# Patient Record
Sex: MALE | Employment: OTHER | URBAN - METROPOLITAN AREA
[De-identification: names, ages, dates, MRNs, and addresses within clinical notes are randomized per-mention and may not be internally consistent; named-entity substitution may affect disease eponyms.]

---

## 2017-06-09 DIAGNOSIS — Z87.891 HISTORY OF TOBACCO USE: Primary | ICD-10-CM

## 2017-09-06 ENCOUNTER — TELEPHONE (OUTPATIENT)
Dept: OPHTHALMOLOGY | Facility: CLINIC | Age: 77
End: 2017-09-06

## 2017-12-20 ENCOUNTER — TELEPHONE (OUTPATIENT)
Dept: HEMATOLOGY/ONCOLOGY | Facility: CLINIC | Age: 77
End: 2017-12-20

## 2017-12-20 NOTE — TELEPHONE ENCOUNTER
Received notification that patient went to [facility].  He was told of the attempts to schedule an apptmnt here at [facility].  Patient refused to come here as he prefers to go stay within [place].  Relayed to Raheel.  =========================================    Unable to reach patient (tried 2 phone numbers)    =========================================  From: Raheel Zavala RN  Sent: 2017  11:40 AM  To: Esthela Waldron RN    Lets do 8am    ----- Message -----  From: Esthela Waldron RN  Sent: 2017  11:13 AM  To: ANGÉLICA Palmer, on the ? what time ?    - Esthela  ----- Message -----  From: Raheel Zavala RN  Sent: 2017  10:12 AM  To: [facility]    Richard Bonilla- prostate cancer   40  Phone [phone]    Dr. Mata wants him on for next Tuesday as a consult. He said they should be faxing records

## 2017-12-22 ENCOUNTER — INITIAL CONSULT (OUTPATIENT)
Dept: HEMATOLOGY/ONCOLOGY | Facility: CLINIC | Age: 77
End: 2017-12-22
Payer: MEDICARE

## 2017-12-22 VITALS
DIASTOLIC BLOOD PRESSURE: 66 MMHG | OXYGEN SATURATION: 97 % | TEMPERATURE: 98 F | HEIGHT: 71 IN | SYSTOLIC BLOOD PRESSURE: 124 MMHG | BODY MASS INDEX: 25.65 KG/M2 | WEIGHT: 183.19 LBS | HEART RATE: 58 BPM

## 2017-12-22 DIAGNOSIS — Z85.528 HISTORY OF KIDNEY CANCER: ICD-10-CM

## 2017-12-22 DIAGNOSIS — R97.20 ELEVATED PSA: ICD-10-CM

## 2017-12-22 DIAGNOSIS — Z85.51 HISTORY OF BLADDER CANCER: Primary | ICD-10-CM

## 2017-12-22 PROBLEM — C67.9 BLADDER CANCER: Status: ACTIVE | Noted: 2017-12-22

## 2017-12-22 PROCEDURE — 99499 UNLISTED E&M SERVICE: CPT | Mod: S$GLB,,, | Performed by: INTERNAL MEDICINE

## 2017-12-22 PROCEDURE — 99999 PR PBB SHADOW E&M-EST. PATIENT-LVL III: CPT | Mod: PBBFAC,,, | Performed by: INTERNAL MEDICINE

## 2017-12-22 RX ORDER — LISINOPRIL 10 MG/1
TABLET ORAL
Refills: 1 | Status: ON HOLD | COMMUNITY
Start: 2017-10-28 | End: 2020-04-19 | Stop reason: CLARIF

## 2017-12-22 RX ORDER — TAMSULOSIN HYDROCHLORIDE 0.4 MG/1
CAPSULE ORAL
Refills: 2 | COMMUNITY
Start: 2017-10-28

## 2017-12-22 RX ORDER — DORZOLAMIDE HYDROCHLORIDE AND TIMOLOL MALEATE 20; 5 MG/ML; MG/ML
SOLUTION/ DROPS OPHTHALMIC
Refills: 2 | Status: ON HOLD | COMMUNITY
Start: 2017-12-06 | End: 2020-04-19 | Stop reason: CLARIF

## 2017-12-22 RX ORDER — HYDROCODONE BITARTRATE AND ACETAMINOPHEN 5; 325 MG/1; MG/1
TABLET ORAL
Refills: 0 | Status: ON HOLD | COMMUNITY
Start: 2017-12-12 | End: 2020-04-19 | Stop reason: CLARIF

## 2017-12-22 RX ORDER — CIPROFLOXACIN 500 MG/1
TABLET ORAL
Refills: 0 | Status: ON HOLD | COMMUNITY
Start: 2017-12-12 | End: 2020-04-19 | Stop reason: CLARIF

## 2017-12-22 RX ORDER — ATENOLOL 25 MG/1
TABLET ORAL
Refills: 2 | Status: ON HOLD | COMMUNITY
Start: 2017-11-16 | End: 2020-04-19 | Stop reason: CLARIF

## 2017-12-22 RX ORDER — GABAPENTIN 100 MG/1
CAPSULE ORAL
Refills: 5 | Status: ON HOLD | COMMUNITY
Start: 2017-10-28 | End: 2020-04-19 | Stop reason: CLARIF

## 2017-12-22 RX ORDER — BRIMONIDINE TARTRATE 2 MG/ML
1 SOLUTION/ DROPS OPHTHALMIC DAILY
Refills: 4 | COMMUNITY
Start: 2017-12-04

## 2017-12-22 RX ORDER — DOCUSATE SODIUM 100 MG/1
CAPSULE, LIQUID FILLED ORAL
Refills: 0 | Status: ON HOLD | COMMUNITY
Start: 2017-11-03 | End: 2020-04-19 | Stop reason: CLARIF

## 2017-12-22 RX ORDER — FUROSEMIDE 40 MG/1
TABLET ORAL
Refills: 2 | Status: ON HOLD | COMMUNITY
Start: 2017-09-23 | End: 2020-04-19 | Stop reason: CLARIF

## 2017-12-22 NOTE — PROGRESS NOTES
Subjective:       Patient ID: Richard Rome is a 77 y.o. male.    Chief Complaint: Consult (bladder ca)    HPI     PT ARRIVED 15 MIN LATE FOR VISIT  PT SEVERELY HEARING IMPAIRED      77-year-old with history of renal cell carcinoma of the left kidney diagnosed several years ago.  Patient status post nephro ureterectomy.  Patient lost to follow-up.  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  MRI of the lumbar spine films 11/30/2017 reveals degenerative changes, no acute fracture evidence for metastases.  Patient had an elevated PSA of 5.58 male nanograms per mL on 12/6/2017.  No family history of prostate cancer.  No prior biopsy of the prostate.  Patient followed by urology and plans for TRUS guided prostate biopsy.  She is accompanied by his daughter today.  Patient and daughter reports that they thought that they were undergoing the prostate biopsy today.  Patient's daughter upset . Patient referred here due to insurance issues.            Past Medical History:   Diagnosis Date    Arthritis     Bladder cancer     Bladder cancer 12/22/2017    Cataract     CHF (congestive heart failure)     Depression     Hypertension     Meningitis     Stroke 2014           Review of Systems   Constitutional: Negative for appetite change, fatigue, fever and unexpected weight change.   HENT: Negative for mouth sores.    Eyes: Negative for visual disturbance.   Respiratory: Negative for cough and shortness of breath.    Cardiovascular: Negative for chest pain.   Gastrointestinal: Negative for abdominal pain and diarrhea.   Genitourinary: Negative for frequency.   Musculoskeletal: Negative for back pain.   Skin: Negative for  "rash.   Neurological: Negative for headaches.   Hematological: Negative for adenopathy.   Psychiatric/Behavioral: The patient is not nervous/anxious.        Objective:        Vitals:    12/22/17 0818 12/22/17 0837   BP: 124/66    BP Location: Right arm    Patient Position: Sitting    BP Method: Medium (Automatic)    Pulse: (!) 59 (!) 58   Temp: 97.6 °F (36.4 °C)    TempSrc: Oral    SpO2: 97%    Weight: 83.1 kg (183 lb 3.2 oz)    Height: 5' 11" (1.803 m)        Physical Exam     Deferred   Assessment:       1. History of bladder cancer    2. History of kidney cancer    3. Elevated PSA        Plan:   77-year-old with history of renal cell carcinoma status post nephroureterectomy and with transitional cell carcinoma of the bladder now with elevated PSA and abnormal CT imaging revealing liver and bony metastases. Patient  and daughter elect to follow-up with urology at [facility]. Daughter reports procedure planned in early January and authorized per insurance  We will gladly refer the patient to urology at this facility.  Patient and daughter decline/not interested in Urology referral at this facility.   Today will follow-up with her treating urologist at [facility].  They will likely need to undergo bone and/or liver biopsy to determine of primary.  Daughter elects to have her treating urologist coordinate any further biopsies    Patient will not be billed for services    Cc: Parviz Ernandez MD      "

## 2017-12-22 NOTE — LETTER
December 24, 2017      Parviz Nascimento MD  [address]  [place] [id number]           [facility]  [address] [place], [place] [address] [address]  Phone: [phone]          Patient: Richard Rome   MR Number: [id number]   YOB: 1940   Date of Visit: 12/22/2017       Dear Dr. Parviz Nascimento:    Thank you for referring Richard Rome to me for evaluation. Attached you will find relevant portions of my assessment and plan of care.    If you have questions, please do not hesitate to call me. I look forward to following Richard Rome along with you.    Sincerely,    Yvette Maloney MD    Enclosure  CC:  No Recipients    If you would like to receive this communication electronically, please contact [email or web address] or [phone] to request more information on [email or web address] Link access.    For providers and/or their staff who would like to refer a patient to [facility], please contact us through our one-stop-shop provider referral line, Lianet Cobb, at [phone].    If you feel you have received this communication in error or would no longer like to receive these types of communications, please e-mail [email or web address]

## 2017-12-22 NOTE — Clinical Note
Pt declines Urology f/u at this facility Pt elects to f/u with his treating urologist and undergo TURP at

## 2020-04-17 ENCOUNTER — ANESTHESIA (OUTPATIENT)
Dept: SURGERY | Facility: HOSPITAL | Age: 80
DRG: 481 | End: 2020-04-17
Payer: MEDICARE

## 2020-04-17 ENCOUNTER — ANESTHESIA EVENT (OUTPATIENT)
Dept: SURGERY | Facility: HOSPITAL | Age: 80
DRG: 481 | End: 2020-04-17
Payer: MEDICARE

## 2020-04-17 ENCOUNTER — HOSPITAL ENCOUNTER (INPATIENT)
Facility: HOSPITAL | Age: 80
LOS: 4 days | Discharge: NURSING, REHAB OR LONG-TERM CARE | DRG: 481 | End: 2020-04-21
Attending: EMERGENCY MEDICINE | Admitting: HOSPITALIST
Payer: MEDICARE

## 2020-04-17 DIAGNOSIS — Z85.528 HISTORY OF RENAL CELL CARCINOMA: ICD-10-CM

## 2020-04-17 DIAGNOSIS — I21.4 ACUTE NON-ST SEGMENT ELEVATION MYOCARDIAL INFARCTION: ICD-10-CM

## 2020-04-17 DIAGNOSIS — S72.141A CLOSED DISPLACED INTERTROCHANTERIC FRACTURE OF RIGHT FEMUR, INITIAL ENCOUNTER: Primary | ICD-10-CM

## 2020-04-17 DIAGNOSIS — R07.9 CHEST PAIN: ICD-10-CM

## 2020-04-17 PROBLEM — I50.9 CHF (CONGESTIVE HEART FAILURE): Status: ACTIVE | Noted: 2020-04-17

## 2020-04-17 PROBLEM — D64.9 ANEMIA: Status: ACTIVE | Noted: 2020-04-17

## 2020-04-17 PROBLEM — C61 PROSTATE CANCER METASTATIC TO BONE: Status: ACTIVE | Noted: 2020-04-17

## 2020-04-17 PROBLEM — N28.9 RENAL INSUFFICIENCY: Status: ACTIVE | Noted: 2020-04-17

## 2020-04-17 PROBLEM — R79.89 ELEVATED TROPONIN: Status: ACTIVE | Noted: 2020-04-17

## 2020-04-17 PROBLEM — C79.51 PROSTATE CANCER METASTATIC TO BONE: Status: ACTIVE | Noted: 2020-04-17

## 2020-04-17 PROBLEM — Z85.51 HISTORY OF BLADDER CANCER: Status: ACTIVE | Noted: 2017-12-22

## 2020-04-17 PROBLEM — I25.10 CORONARY ARTERY DISEASE INVOLVING NATIVE CORONARY ARTERY OF NATIVE HEART WITHOUT ANGINA PECTORIS: Status: ACTIVE | Noted: 2020-04-17

## 2020-04-17 PROBLEM — I50.32 CHRONIC DIASTOLIC CONGESTIVE HEART FAILURE: Status: ACTIVE | Noted: 2020-04-17
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
## 2020-04-17 PROBLEM — I27.20 PULMONARY HYPERTENSION: Status: ACTIVE | Noted: 2020-04-17

## 2020-04-17 PROBLEM — I48.91 ATRIAL FIBRILLATION: Status: ACTIVE | Noted: 2020-04-17

## 2020-04-17 LAB
ABO + RH BLD: NORMAL
ALBUMIN SERPL BCP-MCNC: 3.7 G/DL (ref 3.5–5.2)
ALP SERPL-CCNC: 207 U/L (ref 55–135)
ALT SERPL W/O P-5'-P-CCNC: 22 U/L (ref 10–44)
ANION GAP SERPL CALC-SCNC: 8 MMOL/L (ref 8–16)
AORTIC ROOT ANNULUS: 4.06 CM
AORTIC VALVE CUSP SEPERATION: 2.58 CM
APTT BLDCRRT: 27.1 SEC (ref 21–32)
ASCENDING AORTA: 3.91 CM
AST SERPL-CCNC: 36 U/L (ref 10–40)
AV INDEX (PROSTH): 0.7
AV MEAN GRADIENT: 5 MMHG
AV PEAK GRADIENT: 8 MMHG
AV VALVE AREA: 4.31 CM2
AV VELOCITY RATIO: 0.7
BASOPHILS # BLD AUTO: 0.02 K/UL (ref 0–0.2)
BASOPHILS NFR BLD: 0.3 % (ref 0–1.9)
BILIRUB SERPL-MCNC: 0.6 MG/DL (ref 0.1–1)
BLD GP AB SCN CELLS X3 SERPL QL: NORMAL
BSA FOR ECHO PROCEDURE: 1.91 M2
BUN SERPL-MCNC: 70 MG/DL (ref 8–23)
CALCIUM SERPL-MCNC: 9.4 MG/DL (ref 8.7–10.5)
CHLORIDE SERPL-SCNC: 102 MMOL/L (ref 95–110)
CO2 SERPL-SCNC: 28 MMOL/L (ref 23–29)
CREAT SERPL-MCNC: 1.8 MG/DL (ref 0.5–1.4)
CV ECHO LV RWT: 0.7 CM
DIFFERENTIAL METHOD: ABNORMAL
DOP CALC AO PEAK VEL: 1.38 M/S
DOP CALC AO VTI: 33.47 CM
DOP CALC LVOT AREA: 6.2 CM2
DOP CALC LVOT DIAMETER: 2.81 CM
DOP CALC LVOT PEAK VEL: 0.96 M/S
DOP CALC LVOT STROKE VOLUME: 144.36 CM3
DOP CALCLVOT PEAK VEL VTI: 23.29 CM
E WAVE DECELERATION TIME: 252.9 MSEC
E/A RATIO: 3.42
E/E' RATIO: 11.78 M/S
ECHO LV POSTERIOR WALL: 1.59 CM (ref 0.6–1.1)
EOSINOPHIL # BLD AUTO: 0 K/UL (ref 0–0.5)
EOSINOPHIL NFR BLD: 0.6 % (ref 0–8)
ERYTHROCYTE [DISTWIDTH] IN BLOOD BY AUTOMATED COUNT: 15.9 % (ref 11.5–14.5)
EST. GFR  (AFRICAN AMERICAN): 40 ML/MIN/1.73 M^2
EST. GFR  (NON AFRICAN AMERICAN): 35 ML/MIN/1.73 M^2
FRACTIONAL SHORTENING: 29 % (ref 28–44)
GLUCOSE SERPL-MCNC: 108 MG/DL (ref 70–110)
HCT VFR BLD AUTO: 28.6 % (ref 40–54)
HCT VFR BLD AUTO: 29.9 % (ref 40–54)
HGB BLD-MCNC: 8.7 G/DL (ref 14–18)
HGB BLD-MCNC: 9.1 G/DL (ref 14–18)
IMM GRANULOCYTES # BLD AUTO: 0.04 K/UL (ref 0–0.04)
IMM GRANULOCYTES NFR BLD AUTO: 0.6 % (ref 0–0.5)
INR PPP: 1.1 (ref 0.8–1.2)
INTERVENTRICULAR SEPTUM: 1.65 CM (ref 0.6–1.1)
IVRT: 161.48 MSEC
LA MAJOR: 6.42 CM
LA MINOR: 5.6 CM
LA WIDTH: 4.77 CM
LEFT ATRIUM SIZE: 3.63 CM
LEFT ATRIUM VOLUME INDEX: 45.6 ML/M2
LEFT ATRIUM VOLUME: 88.04 CM3
LEFT INTERNAL DIMENSION IN SYSTOLE: 3.23 CM (ref 2.1–4)
LEFT VENTRICLE DIASTOLIC VOLUME INDEX: 49.15 ML/M2
LEFT VENTRICLE DIASTOLIC VOLUME: 94.98 ML
LEFT VENTRICLE MASS INDEX: 163 G/M2
LEFT VENTRICLE SYSTOLIC VOLUME INDEX: 21.7 ML/M2
LEFT VENTRICLE SYSTOLIC VOLUME: 41.9 ML
LEFT VENTRICULAR INTERNAL DIMENSION IN DIASTOLE: 4.55 CM (ref 3.5–6)
LEFT VENTRICULAR MASS: 315.54 G
LV LATERAL E/E' RATIO: 8.83 M/S
LV SEPTAL E/E' RATIO: 17.67 M/S
LYMPHOCYTES # BLD AUTO: 1.8 K/UL (ref 1–4.8)
LYMPHOCYTES NFR BLD: 25.8 % (ref 18–48)
MCH RBC QN AUTO: 25.1 PG (ref 27–31)
MCHC RBC AUTO-ENTMCNC: 30.4 G/DL (ref 32–36)
MCV RBC AUTO: 82 FL (ref 82–98)
MONOCYTES # BLD AUTO: 0.4 K/UL (ref 0.3–1)
MONOCYTES NFR BLD: 5.4 % (ref 4–15)
MV PEAK A VEL: 0.31 M/S
MV PEAK E VEL: 1.06 M/S
NEUTROPHILS # BLD AUTO: 4.6 K/UL (ref 1.8–7.7)
NEUTROPHILS NFR BLD: 67.3 % (ref 38–73)
NRBC BLD-RTO: 0 /100 WBC
PISA TR MAX VEL: 3.34 M/S
PLATELET # BLD AUTO: 254 K/UL (ref 150–350)
PMV BLD AUTO: 10.2 FL (ref 9.2–12.9)
POTASSIUM SERPL-SCNC: 4.2 MMOL/L (ref 3.5–5.1)
PROT SERPL-MCNC: 8.1 G/DL (ref 6–8.4)
PROTHROMBIN TIME: 12.2 SEC (ref 9–12.5)
PULM VEIN S/D RATIO: 0.43
PV PEAK D VEL: 0.47 M/S
PV PEAK S VEL: 0.2 M/S
PV PEAK VELOCITY: 1.17 CM/S
RA MAJOR: 6.3 CM
RA PRESSURE: 15 MMHG
RA WIDTH: 4.68 CM
RBC # BLD AUTO: 3.63 M/UL (ref 4.6–6.2)
RIGHT VENTRICULAR END-DIASTOLIC DIMENSION: 4.07 CM
RV TISSUE DOPPLER FREE WALL SYSTOLIC VELOCITY 1 (APICAL 4 CHAMBER VIEW): 14.15 CM/S
SARS-COV-2 RDRP RESP QL NAA+PROBE: NEGATIVE
SINUS: 3.74 CM
SODIUM SERPL-SCNC: 138 MMOL/L (ref 136–145)
STJ: 3.26 CM
TDI LATERAL: 0.12 M/S
TDI SEPTAL: 0.06 M/S
TDI: 0.09 M/S
TR MAX PG: 45 MMHG
TRICUSPID ANNULAR PLANE SYSTOLIC EXCURSION: 1.65 CM
TROPONIN I SERPL DL<=0.01 NG/ML-MCNC: 0.12 NG/ML (ref 0–0.03)
TROPONIN I SERPL DL<=0.01 NG/ML-MCNC: 0.17 NG/ML (ref 0–0.03)
TV REST PULMONARY ARTERY PRESSURE: 60 MMHG
WBC # BLD AUTO: 6.83 K/UL (ref 3.9–12.7)

## 2020-04-17 PROCEDURE — 86850 RBC ANTIBODY SCREEN: CPT

## 2020-04-17 PROCEDURE — 71000033 HC RECOVERY, INTIAL HOUR: Performed by: ORTHOPAEDIC SURGERY

## 2020-04-17 PROCEDURE — 85014 HEMATOCRIT: CPT

## 2020-04-17 PROCEDURE — 36415 COLL VENOUS BLD VENIPUNCTURE: CPT

## 2020-04-17 PROCEDURE — 80053 COMPREHEN METABOLIC PANEL: CPT

## 2020-04-17 PROCEDURE — 25000003 PHARM REV CODE 250: Performed by: EMERGENCY MEDICINE

## 2020-04-17 PROCEDURE — 93010 EKG 12-LEAD: ICD-10-PCS | Mod: GW,,, | Performed by: INTERNAL MEDICINE

## 2020-04-17 PROCEDURE — 99223 PR INITIAL HOSPITAL CARE,LEVL III: ICD-10-PCS | Mod: 57,GW,, | Performed by: ORTHOPAEDIC SURGERY

## 2020-04-17 PROCEDURE — D9220A PRA ANESTHESIA: ICD-10-PCS | Mod: GW,CRNA,HPC, | Performed by: NURSE ANESTHETIST, CERTIFIED REGISTERED

## 2020-04-17 PROCEDURE — 36000710: Performed by: ORTHOPAEDIC SURGERY

## 2020-04-17 PROCEDURE — 84484 ASSAY OF TROPONIN QUANT: CPT | Mod: 91

## 2020-04-17 PROCEDURE — D9220A PRA ANESTHESIA: ICD-10-PCS | Mod: GW,ANES,HPC, | Performed by: ANESTHESIOLOGY

## 2020-04-17 PROCEDURE — 99223 1ST HOSP IP/OBS HIGH 75: CPT | Mod: 25,GW,HPC, | Performed by: INTERNAL MEDICINE

## 2020-04-17 PROCEDURE — 25000003 PHARM REV CODE 250: Performed by: NURSE ANESTHETIST, CERTIFIED REGISTERED

## 2020-04-17 PROCEDURE — 36000711: Performed by: ORTHOPAEDIC SURGERY

## 2020-04-17 PROCEDURE — C1713 ANCHOR/SCREW BN/BN,TIS/BN: HCPCS | Performed by: ORTHOPAEDIC SURGERY

## 2020-04-17 PROCEDURE — 37000008 HC ANESTHESIA 1ST 15 MINUTES: Performed by: ORTHOPAEDIC SURGERY

## 2020-04-17 PROCEDURE — 27244 PR TREAT INTER/SUBTROCH FX,W/PLATE/SCREW: ICD-10-PCS | Mod: GW,RT,, | Performed by: ORTHOPAEDIC SURGERY

## 2020-04-17 PROCEDURE — 21400001 HC TELEMETRY ROOM

## 2020-04-17 PROCEDURE — 99285 EMERGENCY DEPT VISIT HI MDM: CPT | Mod: 25

## 2020-04-17 PROCEDURE — 85730 THROMBOPLASTIN TIME PARTIAL: CPT

## 2020-04-17 PROCEDURE — 84484 ASSAY OF TROPONIN QUANT: CPT

## 2020-04-17 PROCEDURE — 85610 PROTHROMBIN TIME: CPT

## 2020-04-17 PROCEDURE — 63600175 PHARM REV CODE 636 W HCPCS: Performed by: ANESTHESIOLOGY

## 2020-04-17 PROCEDURE — 63600175 PHARM REV CODE 636 W HCPCS: Performed by: NURSE ANESTHETIST, CERTIFIED REGISTERED

## 2020-04-17 PROCEDURE — 99223 PR INITIAL HOSPITAL CARE,LEVL III: ICD-10-PCS | Mod: 25,GW,HPC, | Performed by: INTERNAL MEDICINE

## 2020-04-17 PROCEDURE — 25000003 PHARM REV CODE 250: Performed by: HOSPITALIST

## 2020-04-17 PROCEDURE — 85025 COMPLETE CBC W/AUTO DIFF WBC: CPT

## 2020-04-17 PROCEDURE — 37000009 HC ANESTHESIA EA ADD 15 MINS: Performed by: ORTHOPAEDIC SURGERY

## 2020-04-17 PROCEDURE — U0002 COVID-19 LAB TEST NON-CDC: HCPCS

## 2020-04-17 PROCEDURE — 27201423 OPTIME MED/SURG SUP & DEVICES STERILE SUPPLY: Performed by: ORTHOPAEDIC SURGERY

## 2020-04-17 PROCEDURE — D9220A PRA ANESTHESIA: Mod: GW,ANES,HPC, | Performed by: ANESTHESIOLOGY

## 2020-04-17 PROCEDURE — 63600175 PHARM REV CODE 636 W HCPCS: Performed by: EMERGENCY MEDICINE

## 2020-04-17 PROCEDURE — 85018 HEMOGLOBIN: CPT

## 2020-04-17 PROCEDURE — 93010 ELECTROCARDIOGRAM REPORT: CPT | Mod: GW,,, | Performed by: INTERNAL MEDICINE

## 2020-04-17 PROCEDURE — D9220A PRA ANESTHESIA: Mod: GW,CRNA,HPC, | Performed by: NURSE ANESTHETIST, CERTIFIED REGISTERED

## 2020-04-17 PROCEDURE — 93005 ELECTROCARDIOGRAM TRACING: CPT

## 2020-04-17 PROCEDURE — 96374 THER/PROPH/DIAG INJ IV PUSH: CPT

## 2020-04-17 PROCEDURE — 63600175 PHARM REV CODE 636 W HCPCS: Performed by: ORTHOPAEDIC SURGERY

## 2020-04-17 PROCEDURE — 99223 1ST HOSP IP/OBS HIGH 75: CPT | Mod: 57,GW,, | Performed by: ORTHOPAEDIC SURGERY

## 2020-04-17 PROCEDURE — 27244 TREAT THIGH FRACTURE: CPT | Mod: GW,RT,, | Performed by: ORTHOPAEDIC SURGERY

## 2020-04-17 RX ORDER — EPHEDRINE SULFATE 50 MG/ML
INJECTION, SOLUTION INTRAVENOUS
Status: DISCONTINUED | OUTPATIENT
Start: 2020-04-17 | End: 2020-04-17

## 2020-04-17 RX ORDER — HYDROCODONE BITARTRATE AND ACETAMINOPHEN 5; 325 MG/1; MG/1
1 TABLET ORAL EVERY 4 HOURS PRN
Status: DISCONTINUED | OUTPATIENT
Start: 2020-04-17 | End: 2020-04-18

## 2020-04-17 RX ORDER — ONDANSETRON 2 MG/ML
INJECTION INTRAMUSCULAR; INTRAVENOUS
Status: DISCONTINUED | OUTPATIENT
Start: 2020-04-17 | End: 2020-04-17

## 2020-04-17 RX ORDER — HYDROCODONE BITARTRATE AND ACETAMINOPHEN 10; 325 MG/1; MG/1
1 TABLET ORAL EVERY 4 HOURS PRN
Status: DISCONTINUED | OUTPATIENT
Start: 2020-04-17 | End: 2020-04-18

## 2020-04-17 RX ORDER — SODIUM CHLORIDE 0.9 % (FLUSH) 0.9 %
10 SYRINGE (ML) INJECTION
Status: DISCONTINUED | OUTPATIENT
Start: 2020-04-17 | End: 2020-04-21 | Stop reason: HOSPADM

## 2020-04-17 RX ORDER — ONDANSETRON 4 MG/1
4 TABLET, ORALLY DISINTEGRATING ORAL
Status: COMPLETED | OUTPATIENT
Start: 2020-04-17 | End: 2020-04-17

## 2020-04-17 RX ORDER — AMOXICILLIN 250 MG
1 CAPSULE ORAL 2 TIMES DAILY
Status: DISCONTINUED | OUTPATIENT
Start: 2020-04-17 | End: 2020-04-19

## 2020-04-17 RX ORDER — HYDROMORPHONE HYDROCHLORIDE 2 MG/ML
1 INJECTION, SOLUTION INTRAMUSCULAR; INTRAVENOUS; SUBCUTANEOUS
Status: DISCONTINUED | OUTPATIENT
Start: 2020-04-17 | End: 2020-04-18

## 2020-04-17 RX ORDER — HEPARIN SODIUM 5000 [USP'U]/ML
5000 INJECTION, SOLUTION INTRAVENOUS; SUBCUTANEOUS EVERY 8 HOURS
Status: DISCONTINUED | OUTPATIENT
Start: 2020-04-18 | End: 2020-04-21 | Stop reason: HOSPADM

## 2020-04-17 RX ORDER — POLYETHYLENE GLYCOL 3350 17 G/17G
17 POWDER, FOR SOLUTION ORAL DAILY
Status: DISCONTINUED | OUTPATIENT
Start: 2020-04-18 | End: 2020-04-19

## 2020-04-17 RX ORDER — ACETAMINOPHEN 10 MG/ML
1000 INJECTION, SOLUTION INTRAVENOUS ONCE
Status: COMPLETED | OUTPATIENT
Start: 2020-04-17 | End: 2020-04-17

## 2020-04-17 RX ORDER — BRIMONIDINE TARTRATE 2 MG/ML
1 SOLUTION/ DROPS OPHTHALMIC EVERY 8 HOURS
Status: DISCONTINUED | OUTPATIENT
Start: 2020-04-17 | End: 2020-04-20

## 2020-04-17 RX ORDER — CEFAZOLIN SODIUM 1 G/50ML
SOLUTION INTRAVENOUS
Status: DISPENSED
Start: 2020-04-17 | End: 2020-04-18

## 2020-04-17 RX ORDER — ATENOLOL 25 MG/1
25 TABLET ORAL DAILY
Status: DISCONTINUED | OUTPATIENT
Start: 2020-04-17 | End: 2020-04-17

## 2020-04-17 RX ORDER — ACETAMINOPHEN 10 MG/ML
INJECTION, SOLUTION INTRAVENOUS
Status: DISPENSED
Start: 2020-04-17 | End: 2020-04-18

## 2020-04-17 RX ORDER — HYDROMORPHONE HYDROCHLORIDE 2 MG/ML
0.5 INJECTION, SOLUTION INTRAMUSCULAR; INTRAVENOUS; SUBCUTANEOUS
Status: DISCONTINUED | OUTPATIENT
Start: 2020-04-17 | End: 2020-04-17

## 2020-04-17 RX ORDER — NEOSTIGMINE METHYLSULFATE 1 MG/ML
INJECTION, SOLUTION INTRAVENOUS
Status: DISCONTINUED | OUTPATIENT
Start: 2020-04-17 | End: 2020-04-17

## 2020-04-17 RX ORDER — HYDROMORPHONE HYDROCHLORIDE 2 MG/ML
0.2 INJECTION, SOLUTION INTRAMUSCULAR; INTRAVENOUS; SUBCUTANEOUS EVERY 5 MIN PRN
Status: CANCELLED | OUTPATIENT
Start: 2020-04-17

## 2020-04-17 RX ORDER — PROPOFOL 10 MG/ML
VIAL (ML) INTRAVENOUS
Status: DISCONTINUED | OUTPATIENT
Start: 2020-04-17 | End: 2020-04-17

## 2020-04-17 RX ORDER — SODIUM CHLORIDE, SODIUM LACTATE, POTASSIUM CHLORIDE, CALCIUM CHLORIDE 600; 310; 30; 20 MG/100ML; MG/100ML; MG/100ML; MG/100ML
INJECTION, SOLUTION INTRAVENOUS CONTINUOUS PRN
Status: DISCONTINUED | OUTPATIENT
Start: 2020-04-17 | End: 2020-04-17

## 2020-04-17 RX ORDER — LIDOCAINE HYDROCHLORIDE 20 MG/ML
INJECTION INTRAVENOUS
Status: DISCONTINUED | OUTPATIENT
Start: 2020-04-17 | End: 2020-04-17

## 2020-04-17 RX ORDER — ONDANSETRON 2 MG/ML
4 INJECTION INTRAMUSCULAR; INTRAVENOUS EVERY 6 HOURS PRN
Status: DISCONTINUED | OUTPATIENT
Start: 2020-04-17 | End: 2020-04-21 | Stop reason: HOSPADM

## 2020-04-17 RX ORDER — SUCCINYLCHOLINE CHLORIDE 20 MG/ML
INJECTION INTRAMUSCULAR; INTRAVENOUS
Status: DISCONTINUED | OUTPATIENT
Start: 2020-04-17 | End: 2020-04-17

## 2020-04-17 RX ORDER — ACETAMINOPHEN 325 MG/1
650 TABLET ORAL EVERY 6 HOURS PRN
Status: DISCONTINUED | OUTPATIENT
Start: 2020-04-17 | End: 2020-04-18

## 2020-04-17 RX ORDER — SODIUM CHLORIDE 0.9 % (FLUSH) 0.9 %
3 SYRINGE (ML) INJECTION
Status: CANCELLED | OUTPATIENT
Start: 2020-04-17

## 2020-04-17 RX ORDER — CEFAZOLIN SODIUM 1 G/50ML
1 SOLUTION INTRAVENOUS
Status: COMPLETED | OUTPATIENT
Start: 2020-04-17 | End: 2020-04-18

## 2020-04-17 RX ORDER — FENTANYL CITRATE 50 UG/ML
25 INJECTION, SOLUTION INTRAMUSCULAR; INTRAVENOUS EVERY 5 MIN PRN
Status: CANCELLED | OUTPATIENT
Start: 2020-04-17

## 2020-04-17 RX ORDER — SODIUM CHLORIDE 9 MG/ML
INJECTION, SOLUTION INTRAVENOUS CONTINUOUS
Status: DISCONTINUED | OUTPATIENT
Start: 2020-04-17 | End: 2020-04-18

## 2020-04-17 RX ORDER — HYDROMORPHONE HYDROCHLORIDE 2 MG/ML
0.5 INJECTION, SOLUTION INTRAMUSCULAR; INTRAVENOUS; SUBCUTANEOUS
Status: COMPLETED | OUTPATIENT
Start: 2020-04-17 | End: 2020-04-17

## 2020-04-17 RX ORDER — FENTANYL CITRATE 50 UG/ML
INJECTION, SOLUTION INTRAMUSCULAR; INTRAVENOUS
Status: DISCONTINUED | OUTPATIENT
Start: 2020-04-17 | End: 2020-04-17

## 2020-04-17 RX ORDER — GLYCOPYRROLATE 0.2 MG/ML
INJECTION INTRAMUSCULAR; INTRAVENOUS
Status: DISCONTINUED | OUTPATIENT
Start: 2020-04-17 | End: 2020-04-17

## 2020-04-17 RX ORDER — ROCURONIUM BROMIDE 10 MG/ML
INJECTION, SOLUTION INTRAVENOUS
Status: DISCONTINUED | OUTPATIENT
Start: 2020-04-17 | End: 2020-04-17

## 2020-04-17 RX ADMIN — ACETAMINOPHEN 1000 MG: 10 INJECTION, SOLUTION INTRAVENOUS at 07:04

## 2020-04-17 RX ADMIN — EPHEDRINE SULFATE 10 MG: 50 INJECTION, SOLUTION INTRAMUSCULAR; INTRAVENOUS; SUBCUTANEOUS at 04:04

## 2020-04-17 RX ADMIN — BRIMONIDINE TARTRATE 1 DROP: 2 SOLUTION OPHTHALMIC at 09:04

## 2020-04-17 RX ADMIN — CEFAZOLIN SODIUM 1 G: 1 SOLUTION INTRAVENOUS at 08:04

## 2020-04-17 RX ADMIN — SUCCINYLCHOLINE CHLORIDE 80 MG: 20 INJECTION, SOLUTION INTRAMUSCULAR; INTRAVENOUS at 04:04

## 2020-04-17 RX ADMIN — FENTANYL CITRATE 50 MCG: 50 INJECTION INTRAMUSCULAR; INTRAVENOUS at 04:04

## 2020-04-17 RX ADMIN — ONDANSETRON 4 MG: 2 INJECTION, SOLUTION INTRAMUSCULAR; INTRAVENOUS at 06:04

## 2020-04-17 RX ADMIN — SODIUM CHLORIDE, SODIUM LACTATE, POTASSIUM CHLORIDE, AND CALCIUM CHLORIDE: .6; .31; .03; .02 INJECTION, SOLUTION INTRAVENOUS at 03:04

## 2020-04-17 RX ADMIN — GLYCOPYRROLATE 0.6 MG: 0.2 INJECTION, SOLUTION INTRAMUSCULAR; INTRAVENOUS at 07:04

## 2020-04-17 RX ADMIN — ROCURONIUM BROMIDE 5 MG: 10 INJECTION, SOLUTION INTRAVENOUS at 04:04

## 2020-04-17 RX ADMIN — HYDROMORPHONE HYDROCHLORIDE 0.5 MG: 2 INJECTION INTRAMUSCULAR; INTRAVENOUS; SUBCUTANEOUS at 10:04

## 2020-04-17 RX ADMIN — ROCURONIUM BROMIDE 20 MG: 10 INJECTION, SOLUTION INTRAVENOUS at 04:04

## 2020-04-17 RX ADMIN — EPHEDRINE SULFATE 10 MG: 50 INJECTION, SOLUTION INTRAMUSCULAR; INTRAVENOUS; SUBCUTANEOUS at 05:04

## 2020-04-17 RX ADMIN — HYDROMORPHONE HYDROCHLORIDE 0.5 MG: 2 INJECTION, SOLUTION INTRAMUSCULAR; INTRAVENOUS; SUBCUTANEOUS at 07:04

## 2020-04-17 RX ADMIN — NEOSTIGMINE METHYLSULFATE 4 MG: 1 INJECTION INTRAVENOUS at 07:04

## 2020-04-17 RX ADMIN — ONDANSETRON 4 MG: 4 TABLET, ORALLY DISINTEGRATING ORAL at 07:04

## 2020-04-17 RX ADMIN — EPHEDRINE SULFATE 5 MG: 50 INJECTION, SOLUTION INTRAMUSCULAR; INTRAVENOUS; SUBCUTANEOUS at 04:04

## 2020-04-17 RX ADMIN — SODIUM CHLORIDE: 0.9 INJECTION, SOLUTION INTRAVENOUS at 10:04

## 2020-04-17 RX ADMIN — PROPOFOL 80 MG: 10 INJECTION, EMULSION INTRAVENOUS at 04:04

## 2020-04-17 RX ADMIN — Medication 60 MG: at 04:04

## 2020-04-17 RX ADMIN — PROPOFOL 20 MG: 10 INJECTION, EMULSION INTRAVENOUS at 04:04

## 2020-04-17 RX ADMIN — DOCUSATE SODIUM AND SENNOSIDES 1 TABLET: 50; 8.6 TABLET, FILM COATED ORAL at 09:04

## 2020-04-17 NOTE — HPI
79-year-old male with past medical history of Stroke, Congested Heart Failure, and Hypertension presenting for right hip pain status post witnessed fall today. Patient states he was trying to get up from chair and fell. No loss of consciousness or head trauma. He also complains of some chest pain prior to fall. He denies sore throat, cough, shortness of breath, fever, vomiting, diarrhea, sore throat, back pain, neck pain, headache, or any further complaints. He is on oxygen PRN. No modifying factors.     Dx with hip Fx  Denies CP or SOB  EKG A-fib 63 IVCD - no acute STT changes - no recent EKG for comparison  Troponin 0.17  Cr 1.8  COVID-19 pending

## 2020-04-17 NOTE — ED NOTES
"Pt arrived via [place] [employer] on stretcher. Pt fell at home pta and injured rt hip - rt leg shortening noted on arrival . Pt was placed in splint , 18 g iv initiated to rt fa and 75 mg of fentanyl given pta. Pt reports " some improvement of pain , but not completely. "   "

## 2020-04-17 NOTE — ASSESSMENT & PLAN NOTE
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

## 2020-04-17 NOTE — ED PROVIDER NOTES
Encounter Date: 4/17/2020    SCRIBE #1 NOTE: I, Luiz Ward, am scribing for, and in the presence of,  Mitchel Herrera MD. I have scribed the following portions of the note - Other sections scribed: HPI,ROS.       History     Chief Complaint   Patient presents with    Fall     Pt present to the ED via EMS reports the had a witness slip and fall. the hospice nurse reports the patient was trying to stand up out of the chair and fell. The patient has shorterning and rotation of the right leg. unknown of pt is on blood thinners. denies LOC     79-year-old male with past medical history of Stroke, Congested Heart Failure, and Hypertension presenting for right hip pain status post witnessed fall today. Patient states he was trying to get up from chair and fell. No loss of consciousness or head trauma. He also complains of some chest pain prior to fall. He denies sore throat, cough, shortness of breath, fever, vomiting, diarrhea, sore throat, back pain, neck pain, headache, or any further complaints. He is on oxygen PRN. No modifying factors.     The history is provided by the patient. No  was used.     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    Renal cell carcinoma     s/p nephrectomy    Renal insufficiency 4/17/2020    Stroke 2014     Past Surgical History:   Procedure Laterality Date    KNEE SURGERY  2014    NEPHRECTOMY  2013     Family History   Problem Relation Age of Onset    Cancer Mother         throat    Heart disease Mother     Heart disease Father     Heart disease Brother      Social History      Tobacco Use    Smoking status: Former Smoker    Smokeless tobacco: Former User   Substance Use Topics    Alcohol use: Not Currently    Drug use: Never     Review of Systems   Constitutional: Negative for chills, diaphoresis and fever.   HENT: Negative for ear pain and sore throat.    Eyes: Negative for pain.   Respiratory: Negative for cough and shortness of breath.    Cardiovascular: Positive for chest pain.   Gastrointestinal: Negative for abdominal pain, diarrhea and vomiting.   Genitourinary: Negative for dysuria.   Musculoskeletal: Positive for arthralgias. Negative for back pain and neck pain.   Skin: Negative for rash.   Neurological: Negative for syncope and headaches.       Physical Exam     Initial Vitals [04/17/20 0608]   BP Pulse Resp Temp SpO2   138/86 80 18 97.9 °F (36.6 °C) 98 %      MAP       --         Physical Exam  The patient was examined specifically for the following:   General:No significant distress, Good color, Warm and dry. Head and neck:Scalp atraumatic, Neck supple. Neurological:Appropriate conversation, Gross motor deficits. Eyes:Conjugate gaze, Clear corneas. ENT: No epistaxis. Cardiac: Regular rate and rhythm, Grossly normal heart tones. Pulmonary: Wheezing, Rales. Gastrointestinal: Abdominal tenderness, Abdominal distention. Musculoskeletal: Extremity deformity, Apparent pain with range of motion of the joints. Skin: Rash.   The findings on examination were normal except for the following:  The patient has tenderness and pain with range of motion of the right hip.  The right lower extremity shortened and externally rotated.  The lungs are clear the heart tones are normal the abdomen is soft the scalp is atraumatic there is no tenderness of the spine along its entire course.  Patient has scars about the right knee suggesting a total knee replacement.   ED Course   Procedures  Labs Reviewed   COMPREHENSIVE METABOLIC PANEL - Abnormal; Notable for the following components:        Result Value    BUN, Bld 70 (*)     Creatinine 1.8 (*)     Alkaline Phosphatase 207 (*)     eGFR if  40 (*)     eGFR if non  35 (*)     All other components within normal limits   CBC W/ AUTO DIFFERENTIAL - Abnormal; Notable for the following components:    RBC 3.63 (*)     Hemoglobin 9.1 (*)     Hematocrit 29.9 (*)     Mean Corpuscular Hemoglobin 25.1 (*)     Mean Corpuscular Hemoglobin Conc 30.4 (*)     RDW 15.9 (*)     Immature Granulocytes 0.6 (*)     All other components within normal limits   TROPONIN I - Abnormal; Notable for the following components:    Troponin I 0.173 (*)     All other components within normal limits   APTT   PROTIME-INR   SARS-COV-2 RNA AMPLIFICATION, QUAL   TYPE & SCREEN     EKG Readings: (Independently Interpreted)   This patient may be in atrial fibrillation with intraventricular conduction delay he has left axis deviation.  Today's EKG is different from prior EKGs.  The patient had narrow QRS complexes in May of 1990 There are nonspecific ST segment changes.  There is no definite evidence of acute myocardial infarction or malignant arrhythmia.  This EKG is markedly abnormal.     ECG Results          EKG 12-lead (Final result)  Result time 04/17/20 11:45:26    Final result by Interface, Lab [facility] (04/17/20 11:45:26)                 Narrative:    Test Reason : S72.001A,    Vent. Rate : 063 BPM     Atrial Rate : 115 BPM     P-R Int : 000 ms          QRS Dur : 128 ms      QT Int : 530 ms       P-R-T Axes : 000 -49 103 degrees     QTc Int : 542 ms    Atrial fibrillation  Left axis deviation  Nonspecific intraventricular block  T wave abnormality, consider lateral ischemia  Abnormal ECG  When compared with ECG of 27-MAY-1990 07:40,  Significant changes have occurred  Confirmed by Oscar Garza MD ([id number]) on 4/17/2020 11:45:20 AM    Referred By: [email or web address]   SELF           Confirmed By:Oscar Garza MD                            Imaging Results           CT  Hip Without Contrast Right (Final result)  Result time 04/17/20 11:14:24    Final result by Aayush Colón MD (04/17/20 11:14:24)                 Impression:      Partly comminuted right femoral intertrochanteric fracture.  No clearly evident underlying lesion to suggest pathologic component by CT.    Patchy sclerosis of the right femoral head as can be seen with changes of AVN.  No subchondral femoral head collapse.    Right inguinal adenopathy, possibly metastatic in this patient with known history of malignancy.    This report was flagged in Epic as containing an incidental finding.      Electronically signed by: Aayush Colón  Date:    04/17/2020  Time:    11:14             Narrative:    EXAMINATION:  CT HIP WITHOUT CONTRAST RIGHT    CLINICAL HISTORY:  Fracture, hip;  Displaced intertrochanteric fracture of right femur, initial encounter for closed fracture    TECHNIQUE:  1.25 mm    COMPARISON:  Right femur radiograph dated 04/17/2020    FINDINGS:  There is partly comminuted right intertrochanteric fracture.  No convincing CT evidence for underlying pathologic component.  Right femoral head is well seated noting some degenerative joint space narrowing.  Preservation of the femoral head contour with patchy subcortical sclerosis.  Partially visualized femoral stem from knee arthroplasty.  Mineralization change of the proximal right hamstring attachment.  Right inguinal adenopathy including 2.2 cm node (series 2, image 270).                               X-Ray Femur 2 View Right (Final result)  Result time 04/17/20 09:21:31    Final result by Bev Bledsoe MD (04/17/20 09:21:31)                 Impression:      Acute mildly displaced intertrochanteric fracture of the proximal right femur and surgical changes in the distal femur and proximal tibia.      Electronically signed by: Bev Bledsoe MD  Date:    04/17/2020  Time:    09:21             Narrative:    EXAMINATION:  XR FEMUR 2 VIEW  RIGHT    CLINICAL HISTORY:  hip fracture with long stem TKA seen on initial hip films;Displaced intertrochanteric fracture of right femur, initial encounter for closed fracture    TECHNIQUE:  AP and lateral views of the right femur were performed.    COMPARISON:  Hip radiographs April 17, 2020 at 07:21    FINDINGS:  As previously, there is a mildly displaced intertrochanteric fracture of the proximal right femur.  Alignment of the right femoral head with respect to the acetabulum.  Appears to be maintained.  There is an intramedullary fixation nail spanning the right femur with some heterotopic ossification and a partially visualized knee prosthesis.  No additional fractures of the femur are seen.                               X-Ray Hip 2 View Right (Final result)  Result time 04/17/20 07:41:12    Final result by Bassem Mendoza MD (04/17/20 07:41:12)                 Impression:      Limited exam secondary to patient rotation.  Acute mildly displaced intertrochanteric fracture of the proximal right femur.      Electronically signed by: Bassem Mendoza MD  Date:    04/17/2020  Time:    07:41             Narrative:    EXAMINATION:  XR HIP 2 VIEW RIGHT    CLINICAL HISTORY:  Pain in right hip    TECHNIQUE:  AP view of the pelvis and frog leg lateral view of the right hip were performed.    COMPARISON:  None    FINDINGS:  The patient is rotated limiting assessment.  There is a mildly displaced intertrochanteric fracture of the proximal right femur.  Right femoral head appears to remain appropriately seated within the acetabulum.  There is a partially visualized retrograde intramedullary fixation nail spanning the right femur.  The sacrum and bony pelvis is partially obscured by overlying bowel gas.                                X-Ray Chest 1 View (Final result)  Result time 04/17/20 07:44:03    Final result by Bassem Mendoza MD (04/17/20 07:44:03)                 Impression:      1.  Mild coarse accentuated  interstitial lung markings without evidence of confluent airspace consolidation or pneumothorax.    2.  1.5 cm nodular opacity projecting over the right superior hilar region.  This may represent a prominent pulmonary vessel, although recommend nonemergent chest CT follow-up to exclude underlying pulmonary nodule.  Alternatively, correlation with any prior outside imaging for stability is advised.    This report was flagged in Epic as containing an incidental finding.      Electronically signed by: Bassem Mendoza MD  Date:    04/17/2020  Time:    07:44             Narrative:    EXAMINATION:  XR CHEST 1 VIEW    CLINICAL HISTORY:  Encounter for other preprocedural examination    TECHNIQUE:  Single frontal view of the chest was performed.    COMPARISON:  None    FINDINGS:  Cardiac monitoring leads overlie the chest.  Cardiomediastinal silhouette is mildly enlarged.  There are mildly accentuated coarse interstitial lung markings bilaterally.  There is a 1.5 cm nodular opacity projecting over the right superior hilar region.  The remaining lungs demonstrate no confluent airspace consolidation or pleural effusion.  There is no pneumothorax.  Osseous structures demonstrate degenerative changes.                              Medical decision making:  This patient presents to the emergency room with pain in his right hip after a fall.  The patient also reports some chest pain that he had prior to the fall.  He has an elevated troponin.  We will admit the patient to hospital medicine.  Will consult Cardiology.  I reviewed the case with , who noted a history of renal cell carcinoma and previous study showing lytic lesions in the thoracic spine.  The possibility of pathologic fracture is discussed.  I did discuss the case with Dr. Amaro who asks for admission to medicine, and medical clearance by Cardiology.  I spoke with Dr. Garza.  He asked to hold off with anticoagulation and order 2D echo.                 Scribe Attestation:   Scribe #1: I performed the above scribed service and the documentation accurately describes the services I performed. I attest to the accuracy of the note.                          Clinical Impression:       ICD-10-CM ICD-9-CM   1. Closed displaced intertrochanteric fracture of right femur, initial encounter S72.141A 820.21   2. Acute non-ST segment elevation myocardial infarction I21.4 410.70   3. History of renal cell carcinoma Z85.528 V10.52             ED Disposition Condition    Admit                           Mitchel Herrera MD  04/17/20 1523

## 2020-04-17 NOTE — HOSPITAL COURSE
Admitted with R proximal femur intertrochanteric fracture. Ortho consulted and performed ORIF on 4/17.  Also with Afib rate controlled. Elevated troponin. Cardiology consulted. Post op, complained of chest pain; EKG no ischemia but is in Afib, troponin similar. Unlikely ACS. Pain associated with coughing and improving with mucinex.

## 2020-04-17 NOTE — ASSESSMENT & PLAN NOTE
No active or prior treatment per daughter  Follows at [facility]  On hospice for this for the past year

## 2020-04-17 NOTE — ANESTHESIA PREPROCEDURE EVALUATION
04/17/2020  Richard Rome is a 79 y.o., male.    Pre-operative evaluation for Procedure(s) (LRB):  ORIF, HIP, USING DYNAMIC HIP SCREW (Right)    Richard Rome is a 79 y.o. male for emergent right hip ORIF per orthopedics.    Denies CP/SOB/GERD.  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  NPO > 8  METS < 4    Patient Active Problem List   Diagnosis    History of bladder cancer    Intertrochanteric fracture of right femur    Anemia    Renal insufficiency    Elevated troponin    History of renal cell carcinoma    Fall    CHF (congestive heart failure)    Atrial fibrillation    Prostate cancer metastatic to bone    Coronary artery disease involving native coronary artery of native heart without angina pectoris       Review of patient's allergies indicates:   Allergen Reactions    Iodine and iodide containing products Swelling       No current facility-administered medications on file prior to encounter.      Current Outpatient Medications on File Prior to Encounter   Medication Sig Dispense Refill    atenolol (TENORMIN) 25 MG tablet TK 1 T PO  QD  2    brimonidine 0.2% (ALPHAGAN) 0.2 % Drop INSTILL ONE DROP INTO OU BID  4    ciprofloxacin HCl (CIPRO) 500 MG tablet TK 1 T PO  BID. START THE DAY PRIOR TO BIOPSY  0     mg capsule TK ONE C PO ONCE D  0    dorzolamide-timolol 2-0.5% (COSOPT) 22.3-6.8 mg/mL ophthalmic solution INSTILL 1 GTT IN OU BID  2    furosemide (LASIX) 40 MG tablet TK 1 T PO  ONCE D  2    gabapentin (NEURONTIN) 100 MG capsule TK 2 C QHS PRN FOR FOOT P  5    hydrocodone-acetaminophen 5-325mg (NORCO) 5-325 mg per tablet TK 1 TO 2 TS PO Q 4 H PRN P  0    lisinopril 10 MG tablet TK 1 T PO  ONCE D  1    tamsulosin (FLOMAX) 0.4 mg Cp24 TK ONE C PO  QHS FOR PROSTATE  2       Past Surgical History:   Procedure  Laterality Date    KNEE SURGERY  2014    NEPHRECTOMY  2013       Social History     Socioeconomic History    Marital status: Unknown     Spouse name: Not on file    Number of children: Not on file    Years of education: Not on file    Highest education level: Not on file   Occupational History    Not on file   Social Needs    Financial resource strain: Not on file    Food insecurity:     Worry: Not on file     Inability: Not on file    Transportation needs:     Medical: Not on file     Non-medical: Not on file   Tobacco Use    Smoking status: Former Smoker    Smokeless tobacco: Former User   Substance and Sexual Activity    Alcohol use: Not Currently    Drug use: Never    Sexual activity: Not on file   Lifestyle    Physical activity:     Days per week: Not on file     Minutes per session: Not on file    Stress: Not on file   Relationships    Social connections:     Talks on phone: Not on file     Gets together: Not on file     Attends Yazidism service: Not on file     Active member of club or organization: Not on file     Attends meetings of clubs or organizations: Not on file     Relationship status: Not on file   Other Topics Concern    Not on file   Social History Narrative    Not on file         CBC:   Recent Labs     04/17/20  0625   WBC 6.83   RBC 3.63*   HGB 9.1*   HCT 29.9*      MCV 82   MCH 25.1*   MCHC 30.4*       CMP:   Recent Labs     04/17/20  0625      K 4.2      CO2 28   BUN 70*   CREATININE 1.8*      CALCIUM 9.4   ALBUMIN 3.7   PROT 8.1   ALKPHOS 207*   ALT 22   AST 36   BILITOT 0.6       INR  Recent Labs     04/17/20  0625   INR 1.1   APTT 27.1         Vitals:    04/17/20 1135   BP: 121/71   Pulse: 61   Resp: 16   Temp: 36.6 °C (97.9 °F)     See nursing charting for additional vital signs      Diagnostic Studies:  Results for ASAF SAMUELS (MRN [id number]) as of 4/17/2020 12:29   Ref. Range 4/17/2020 06:25   WBC Latest Ref Range: 3.90 - 12.70 K/uL 6.83    RBC Latest Ref Range: 4.60 - 6.20 M/uL 3.63 (L)   Hemoglobin Latest Ref Range: 14.0 - 18.0 g/dL 9.1 (L)   Hematocrit Latest Ref Range: 40.0 - 54.0 % 29.9 (L)   MCV Latest Ref Range: 82 - 98 fL 82   MCH Latest Ref Range: 27.0 - 31.0 pg 25.1 (L)   MCHC Latest Ref Range: 32.0 - 36.0 g/dL 30.4 (L)   RDW Latest Ref Range: 11.5 - 14.5 % 15.9 (H)   Platelets Latest Ref Range: 150 - 350 K/uL 254   MPV Latest Ref Range: 9.2 - 12.9 fL 10.2   Gran% Latest Ref Range: 38.0 - 73.0 % 67.3   Gran # (ANC) Latest Ref Range: 1.8 - 7.7 K/uL 4.6   Lymph% Latest Ref Range: 18.0 - 48.0 % 25.8   Lymph # Latest Ref Range: 1.0 - 4.8 K/uL 1.8   Mono% Latest Ref Range: 4.0 - 15.0 % 5.4   Mono # Latest Ref Range: 0.3 - 1.0 K/uL 0.4   Eosinophil% Latest Ref Range: 0.0 - 8.0 % 0.6   Eos # Latest Ref Range: 0.0 - 0.5 K/uL 0.0   Basophil% Latest Ref Range: 0.0 - 1.9 % 0.3   Baso # Latest Ref Range: 0.00 - 0.20 K/uL 0.02   nRBC Latest Ref Range: 0 /100 WBC 0   Differential Method Unknown Automated   Immature Grans (Abs) Latest Ref Range: 0.00 - 0.04 K/uL 0.04   Immature Granulocytes Latest Ref Range: 0.0 - 0.5 % 0.6 (H)   Protime Latest Ref Range: 9.0 - 12.5 sec 12.2   INR Latest Ref Range: 0.8 - 1.2  1.1   aPTT Latest Ref Range: 21.0 - 32.0 sec 27.1   Sodium Latest Ref Range: 136 - 145 mmol/L 138   Potassium Latest Ref Range: 3.5 - 5.1 mmol/L 4.2   Chloride Latest Ref Range: 95 - 110 mmol/L 102   CO2 Latest Ref Range: 23 - 29 mmol/L 28   Anion Gap Latest Ref Range: 8 - 16 mmol/L 8   BUN, Bld Latest Ref Range: 8 - 23 mg/dL 70 (H)   Creatinine Latest Ref Range: 0.5 - 1.4 mg/dL 1.8 (H)   eGFR if non African American Latest Ref Range: >60 mL/min/1.73 m^2 35 (A)   eGFR if African American Latest Ref Range: >60 mL/min/1.73 m^2 40 (A)   Glucose Latest Ref Range: 70 - 110 mg/dL 108   Calcium Latest Ref Range: 8.7 - 10.5 mg/dL 9.4   Alkaline Phosphatase Latest Ref Range: 55 - 135 U/L 207 (H)   PROTEIN TOTAL Latest Ref Range: 6.0 - 8.4 g/dL 8.1   Albumin  Latest Ref Range: 3.5 - 5.2 g/dL 3.7   BILIRUBIN TOTAL Latest Ref Range: 0.1 - 1.0 mg/dL 0.6   AST Latest Ref Range: 10 - 40 U/L 36   ALT Latest Ref Range: 10 - 44 U/L 22     EKG (4/17/20):  Atrial fibrillation  Left axis deviation  Nonspecific intraventricular block  T wave abnormality, consider lateral ischemia  Abnormal ECG    TTE (4/17/20):  · Normal left ventricular systolic function. The estimated ejection fraction is 60%.  · Concentric left ventricular hypertrophy.  · Grade III (severe) left ventricular diastolic dysfunction consistent with restrictive physiology.  · Normal right ventricular systolic function.  · Moderate left atrial enlargement.  · Mild right atrial enlargement.  · Mild mitral regurgitation.  · Mild tricuspid regurgitation.  · Elevated central venous pressure (15 mmHg).  · The estimated PA systolic pressure is 60 mmHg.  · Pulmonary hypertension present.  · The aortic root is mildly dilated    Anesthesia Evaluation    I have reviewed the Patient Summary Reports.    I have reviewed the Nursing Notes.      Review of Systems  Anesthesia Hx:  No problems with previous Anesthesia  History of prior surgery of interest to airway management or planning:  Denies Personal Hx of Anesthesia complications.   Social:  No Alcohol Use, Former Smoker    Hematology/Oncology:         -- Anemia: --  Cancer in past history:  Oncology Comments: H/o bladder CA and current prostate CA     Cardiovascular:   Exercise tolerance: poor Hypertension, well controlled Past MI CAD asymptomatic  CHF ECG has been reviewed. Troponemia, cleared for surgery at high risk per cardiology, history of multiple MIs in the past (last 1 year ago)   Pulmonary:  Pulmonary Normal    Renal/:   Chronic Renal Disease, CRI    Hepatic/GI:  Hepatic/GI Normal    Neurological:   CVA, residual symptoms CVA with residual R eye blindness       Physical Exam  General:  Well nourished    Airway/Jaw/Neck:   MP3, TMD >3FB, edentulous      Chest/Lungs:  Chest/Lungs Clear    Heart/Vascular:  Heart Findings: Rhythm: Irregularly Irregular             Anesthesia Plan  Type of Anesthesia, risks & benefits discussed:  Anesthesia Type:  general, spinal  Patient's Preference:   Intra-op Monitoring Plan: standard ASA monitors  Intra-op Monitoring Plan Comments:   Post Op Pain Control Plan: multimodal analgesia, IV/PO Opioids PRN and per primary service following discharge from PACU  Post Op Pain Control Plan Comments:   Induction:   IV  Beta Blocker:  Patient is on a Beta-Blocker and has received one dose within the past 24 hours (No further documentation required).       Informed Consent: Patient understands risks and agrees with Anesthesia plan.  Questions answered. Anesthesia consent signed with patient.  ASA Score: 4  emergent   Day of Surgery Review of History & Physical:  There are no significant changes.      Anesthesia Plan Notes: Pending TTE        Ready For Surgery From Anesthesia Perspective.

## 2020-04-17 NOTE — H&P
"[facility] Hospital Medicine  History & Physical    Patient Name: Richard Rome  MRN: [id number]  Admission Date: 4/17/2020  Attending Physician: Beth Elaine MD   Primary Care Provider: Edvin Reyes MD         Patient information was obtained from patient, relative(s), past medical records and ER records.     Subjective:     Principal Problem:Intertrochanteric fracture of right femur    Chief Complaint:   Chief Complaint   Patient presents with    Fall     Pt present to the ED via EMS reports the had a witness slip and fall. the hospice nurse reports the patient was trying to stand up out of the chair and fell. The patient has shorterning and rotation of the right leg. unknown of pt is on blood thinners. denies LOC        HPI: Mr Richard Rome is a 79 y.o. man with prostate cancer, CHF, stroke who presents after fall. He lives at home alone in an apartment. He walks with a rollator at baseline but does have issues with balance. He fell on Friday (unclear if he means last Friday or this Friday). Then he fell again today when he was getting out of his chair into his walker. He denies hitting his head, but he said that everything "went black." He denies chest pain, shortness of breath, palpitations, presyncope prior to the fall. He denies biting his tongue and bowel/bladder incontinence. He currently has pain in his right groin that he cannot describe more than "it hurts." He denies pain elsewhere. He says he is also having difficulty urinating and has been trying to urinate for the last 45 minutes.     In ED, found to have acute mildly displaced intertrochanteric fracture of the proximal right femur. Also with Afib on arrival-- unknown if new vs old, elevated troponin without symptoms of ACS, and impaired renal function- unknown if new vs old. He has a prior history of renal cell carcinoma s/p nephrectomy (not active), bladder cancer s/p TURBT (also not thought to be active), and prostate " cancer with metastatic disease to bones (follows at [facility]). He also says he has CHF-- unknown last EF but denies shortness of breath, orthopnea, PND, leg swelling, abdominal distension.     Called daughter Odilia Rome. Daughter says he fell a week ago, 2 days ago. She says he is on hospice and has a hospice nurse that checks on him. He is on hospice for prostate cancer for the last year. He has not received any treatment for prostate cancer. He has multiple strokes with no residual deficits. He has had multiple heart attacks in the past- last was several years ago. Daughter confirms that he does have a history of Afib but does not know about any kidney dysfunction.     Past Medical History:   Diagnosis Date    Arthritis     Bladder cancer 12/22/2017    s/p TURBT    Cataract     CHF (congestive heart failure)     Depression     Hypertension     Meningitis     Prostate cancer     with bone metastases; on hospice for this    Renal cell carcinoma     s/p nephrectomy    Renal insufficiency 4/17/2020    Stroke 2014       Past Surgical History:   Procedure Laterality Date    KNEE SURGERY  2014    NEPHRECTOMY  2013       Review of patient's allergies indicates:   Allergen Reactions    Iodine and iodide containing products Swelling       No current facility-administered medications on file prior to encounter.      Current Outpatient Medications on File Prior to Encounter   Medication Sig    atenolol (TENORMIN) 25 MG tablet TK 1 T PO  QD    brimonidine 0.2% (ALPHAGAN) 0.2 % Drop INSTILL ONE DROP INTO OU BID    ciprofloxacin HCl (CIPRO) 500 MG tablet TK 1 T PO  BID. START THE DAY PRIOR TO BIOPSY     mg capsule TK ONE C PO ONCE D    dorzolamide-timolol 2-0.5% (COSOPT) 22.3-6.8 mg/mL ophthalmic solution INSTILL 1 GTT IN OU BID    furosemide (LASIX) 40 MG tablet TK 1 T PO  ONCE D    gabapentin (NEURONTIN) 100 MG capsule TK 2 C QHS PRN FOR FOOT P    hydrocodone-acetaminophen 5-325mg (NORCO)  5-325 mg per tablet TK 1 TO 2 TS PO Q 4 H PRN P    lisinopril 10 MG tablet TK 1 T PO  ONCE D    tamsulosin (FLOMAX) 0.4 mg Cp24 TK ONE C PO  QHS FOR PROSTATE     Unknown home meds-- daughter will have hospice fax them over    Family History     Problem Relation (Age of Onset)    Cancer Mother    Heart disease Mother, Father, Brother        Tobacco Use    Smoking status: Former Smoker    Smokeless tobacco: Former User   Substance and Sexual Activity    Alcohol use: Not Currently    Drug use: Never    Sexual activity: Not on file     Review of Systems   Constitutional: Negative for activity change, appetite change, chills, fatigue and fever.   HENT: Negative for congestion, sinus pressure and sinus pain.    Respiratory: Negative for cough, chest tightness, shortness of breath and wheezing.    Cardiovascular: Negative for chest pain, palpitations and leg swelling.   Gastrointestinal: Negative for abdominal pain, constipation, diarrhea, nausea and vomiting.   Genitourinary: Negative for difficulty urinating.   Musculoskeletal: Positive for arthralgias. Negative for myalgias.   Skin: Negative for rash and wound.   Neurological: Negative for dizziness, weakness, light-headedness, numbness and headaches.   Psychiatric/Behavioral: Negative for agitation and confusion.     Objective:     Vital Signs (Most Recent):  Temp: 97.9 °F (36.6 °C) (04/17/20 0918)  Pulse: 61 (04/17/20 0918)  Resp: 16 (04/17/20 0918)  BP: 120/64 (04/17/20 0918)  SpO2: 95 % (04/17/20 0918) Vital Signs (24h Range):  Temp:  [97.9 °F (36.6 °C)] 97.9 °F (36.6 °C)  Pulse:  [61-80] 61  Resp:  [16-21] 16  SpO2:  [94 %-98 %] 95 %  BP: (120-138)/(58-86) 120/64     Weight: 72.6 kg (160 lb)  Body mass index is 21.7 kg/m².    Physical Exam   Constitutional: He is oriented to person, place, and time. He appears well-developed and well-nourished. No distress.   Elderly man   HENT:   Head: Normocephalic and atraumatic.   Nose: Nose normal.   Mouth/Throat:  Oropharynx is clear and moist.   Eyes: Conjunctivae and EOM are normal. No scleral icterus.   Neck: Neck supple. No JVD present. No thyromegaly present.   Cardiovascular: Normal rate, regular rhythm and normal heart sounds. Exam reveals no gallop and no friction rub.   No murmur heard.  Pulmonary/Chest: Effort normal and breath sounds normal. No stridor. No respiratory distress. He has no wheezes. He has no rales.   Room air   Abdominal: Soft. Bowel sounds are normal. He exhibits no distension and no mass. There is no tenderness. There is no guarding.   Musculoskeletal: He exhibits edema (trace bilateral shins), tenderness (R groin tenderness to palpation) and deformity (R leg shortened and rotated).   Lymphadenopathy:     He has no cervical adenopathy.   Neurological: He is alert and oriented to person, place, and time.   Skin: Skin is warm. He is not diaphoretic.   Nursing note and vitals reviewed.        CRANIAL NERVES     CN III, IV, VI   Extraocular motions are normal.        Significant Labs: All pertinent labs within the past 24 hours have been reviewed.    Significant Imaging: I have reviewed and interpreted all pertinent imaging results/findings within the past 24 hours.    Assessment/Plan:     * Intertrochanteric fracture of right femur  Fall at home causing fracture. With history of multiple malignancies- unknown if pathologic. CT pending.   Ortho consulted  Given multiple strokes, heart attacks with now elevated troponin, CHF with unknown EF, renal insufficiency, Afib, Cardiology consulted for pre-op assessment-- high risk for surgery  Pain control with IV dilaudid for now-- reassess after surgery  Bowel regimen after surgery  [facility] place today  PT, OT after surgery  On home hospice-- will need to discuss dispo after fall       Coronary artery disease involving native coronary artery of native heart without angina pectoris  History of multiple MI, last was a few years ago per daughter  Unknown home med  list  With elevated troponin/possible NSTEMI on admit      Prostate cancer metastatic to bone  No active or prior treatment per daughter  Follows at [facility]  On hospice for this for the past year        Atrial fibrillation  Per daughter, this is not new  Unknown if he is on rate/rhythm control Rx or anticoagulation at home-- waiting for home med list  Rate controlled currently  Poor candidate for anticoagulation given falls at home, but CHADS-VASc is high (CHF, HTN, age, stroke history)  Monitor HR      CHF (congestive heart failure)  Unknown EF  Due to CAD  Mild lower extremity edema currently, but does not seem to have active CHF exacerbation  TTE pending   Waiting for med list from daughter to see if he is on asa/BB/ACEi/ARB/statin at home      Fall  Causing hip fracture  PT, OT after surgery       History of renal cell carcinoma  S/p L nephro ureterectomy  Not currently active per daughter      Elevated troponin  Trop 0.173 on admit. EKG no acute ST-T changes  TTE pending   He has a history of MI in the past and CAD with ischemic cardiomyopathy/CHF (per daughter)  Patient denies ACS symptoms prior to fall. Unclear if this is NSTEMI or demand ischemia  Cardiology consulted   Do not start hep gtt/asa/plavix given plans to go to OR for hip fracture today  Also, patient is on hospice care and not a good candidate for angiogram either way. Need to discuss goals of care more after surgery       Renal insufficiency  Cr 1.8 on admit, unknown if this is acute or chronic. Does have solitary kidney after history of RCC s/p nephrectomy  IVF while NPO, but cautious due to CHF history  Place severino  Monitor  Avoid nephrotoxins, renal dose all meds      Anemia  No bleeding noted. Unknown if chronic but suspect it is. Monitor post op      History of bladder cancer  Not active per daughter  Per last Onc note (in [facility] system, unable to access [facility] records)-- TCC TA, G1 22 and has been treated with TUR resection          VTE Risk Mitigation (From admission, onward)         Ordered     IP VTE HIGH RISK PATIENT  Once      04/17/20 1012     Place sequential compression device  Until discontinued      04/17/20 1012                   Beth Elaine MD  Department of Hospital Medicine   [facility]

## 2020-04-17 NOTE — ASSESSMENT & PLAN NOTE
Unclear if this is demand ischemia or ACS. Will hold off anticoagulation in the event he needs surgery. Check echo. Trend troponin. Denies CP. EKG without acute ischemic changes

## 2020-04-17 NOTE — ED NOTES
Called daughter to  patients wallet and personal belongings per patient request. She states she will come get them as soon as she has a ride here. Belongings all placed in one patient belonging bag in patients bed awaiting daughters arrival.

## 2020-04-17 NOTE — ASSESSMENT & PLAN NOTE
Cr 1.8 on admit, unknown if this is acute or chronic. Does have solitary kidney after history of RCC s/p nephrectomy  IVF while NPO, but cautious due to CHF history  Place severino  Monitor  Avoid nephrotoxins, renal dose all meds

## 2020-04-17 NOTE — SUBJECTIVE & OBJECTIVE
Past Medical History:   Diagnosis Date    Arthritis     Bladder cancer     Bladder cancer 12/22/2017    Cataract     CHF (congestive heart failure)     Depression     Hypertension     Meningitis     Renal insufficiency 4/17/2020    Stroke 2014       Past Surgical History:   Procedure Laterality Date    KNEE SURGERY  2014    NEPHRECTOMY  2013       Review of patient's allergies indicates:   Allergen Reactions    Iodine and iodide containing products Swelling       No current facility-administered medications on file prior to encounter.      Current Outpatient Medications on File Prior to Encounter   Medication Sig    atenolol (TENORMIN) 25 MG tablet TK 1 T PO  QD    brimonidine 0.2% (ALPHAGAN) 0.2 % Drop INSTILL ONE DROP INTO OU BID    ciprofloxacin HCl (CIPRO) 500 MG tablet TK 1 T PO  BID. START THE DAY PRIOR TO BIOPSY     mg capsule TK ONE C PO ONCE D    dorzolamide-timolol 2-0.5% (COSOPT) 22.3-6.8 mg/mL ophthalmic solution INSTILL 1 GTT IN OU BID    furosemide (LASIX) 40 MG tablet TK 1 T PO  ONCE D    gabapentin (NEURONTIN) 100 MG capsule TK 2 C QHS PRN FOR FOOT P    hydrocodone-acetaminophen 5-325mg (NORCO) 5-325 mg per tablet TK 1 TO 2 TS PO Q 4 H PRN P    lisinopril 10 MG tablet TK 1 T PO  ONCE D    tamsulosin (FLOMAX) 0.4 mg Cp24 TK ONE C PO  QHS FOR PROSTATE     Family History     None        Tobacco Use    Smoking status: Former Smoker    Smokeless tobacco: Former User   Substance and Sexual Activity    Alcohol use: Not on file    Drug use: Not on file    Sexual activity: Not on file     Review of Systems   Constitution: Negative for decreased appetite.   HENT: Negative for ear discharge.    Eyes: Negative for blurred vision.   Endocrine: Negative for polyphagia.   Skin: Negative for nail changes.   Genitourinary: Negative for bladder incontinence.   Neurological: Negative for aphonia.   Psychiatric/Behavioral: Negative for hallucinations.   Allergic/Immunologic: Negative  for hives.     Objective:     Vital Signs (Most Recent):  Temp: 97.9 °F (36.6 °C) (04/17/20 0608)  Pulse: 61 (04/17/20 0839)  Resp: 16 (04/17/20 0839)  BP: (!) 120/58 (04/17/20 0832)  SpO2: 98 % (04/17/20 0839) Vital Signs (24h Range):  Temp:  [97.9 °F (36.6 °C)] 97.9 °F (36.6 °C)  Pulse:  [61-80] 61  Resp:  [16-18] 16  SpO2:  [97 %-98 %] 98 %  BP: (120-138)/(58-86) 120/58     Weight: 72.6 kg (160 lb)  Body mass index is 21.7 kg/m².    SpO2: 98 %  O2 Device (Oxygen Therapy): room air    No intake or output data in the 24 hours ending 04/17/20 0901    Lines/Drains/Airways     Peripheral Intravenous Line                 Peripheral IV - Single Lumen 04/17/20 0634 20 G Right Forearm less than 1 day                Physical Exam   Constitutional: He is oriented to person, place, and time. He appears well-developed and well-nourished.   HENT:   Head: Normocephalic and atraumatic.   Eyes: Pupils are equal, round, and reactive to light. Conjunctivae are normal.   Neck: Normal range of motion. Neck supple.   Cardiovascular: Normal rate, normal heart sounds and intact distal pulses. An irregularly irregular rhythm present.   Pulmonary/Chest: Effort normal and breath sounds normal.   Abdominal: Soft. Bowel sounds are normal.   Musculoskeletal: Normal range of motion.   Neurological: He is alert and oriented to person, place, and time.   Skin: Skin is warm and dry.       Significant Labs: All pertinent lab results from the last 24 hours have been reviewed.    Significant Imaging: Echocardiogram: 2D echo with color flow doppler: No results found for this or any previous visit.

## 2020-04-17 NOTE — ASSESSMENT & PLAN NOTE
New Dx. Duration unknown. Rate controlled. Poor candidate for long term anticoagulation. Check echo. Observe on telemetry

## 2020-04-17 NOTE — ASSESSMENT & PLAN NOTE
History of multiple MI, last was a few years ago per daughter  Unknown home med list  With elevated troponin/possible NSTEMI on admit

## 2020-04-17 NOTE — ASSESSMENT & PLAN NOTE
Per daughter, this is not new  Unknown if he is on rate/rhythm control Rx or anticoagulation at home-- waiting for home med list  Rate controlled currently  Poor candidate for anticoagulation given falls at home, but CHADS-VASc is high (CHF, HTN, age, stroke history)  Monitor HR

## 2020-04-17 NOTE — SUBJECTIVE & OBJECTIVE
Past Medical History:   Diagnosis Date    Arthritis     Bladder cancer 12/22/2017    s/p TURBT    Cataract     CHF (congestive heart failure)     Depression     Hypertension     Meningitis     Prostate cancer     with bone metastases; on hospice for this    Renal cell carcinoma     s/p nephrectomy    Renal insufficiency 4/17/2020    Stroke 2014       Past Surgical History:   Procedure Laterality Date    KNEE SURGERY  2014    NEPHRECTOMY  2013       Review of patient's allergies indicates:   Allergen Reactions    Iodine and iodide containing products Swelling       No current facility-administered medications on file prior to encounter.      Current Outpatient Medications on File Prior to Encounter   Medication Sig    atenolol (TENORMIN) 25 MG tablet TK 1 T PO  QD    brimonidine 0.2% (ALPHAGAN) 0.2 % Drop INSTILL ONE DROP INTO OU BID    ciprofloxacin HCl (CIPRO) 500 MG tablet TK 1 T PO  BID. START THE DAY PRIOR TO BIOPSY     mg capsule TK ONE C PO ONCE D    dorzolamide-timolol 2-0.5% (COSOPT) 22.3-6.8 mg/mL ophthalmic solution INSTILL 1 GTT IN OU BID    furosemide (LASIX) 40 MG tablet TK 1 T PO  ONCE D    gabapentin (NEURONTIN) 100 MG capsule TK 2 C QHS PRN FOR FOOT P    hydrocodone-acetaminophen 5-325mg (NORCO) 5-325 mg per tablet TK 1 TO 2 TS PO Q 4 H PRN P    lisinopril 10 MG tablet TK 1 T PO  ONCE D    tamsulosin (FLOMAX) 0.4 mg Cp24 TK ONE C PO  QHS FOR PROSTATE     Unknown home meds-- daughter will have hospice fax them over    Family History     Problem Relation (Age of Onset)    Cancer Mother    Heart disease Mother, Father, Brother        Tobacco Use    Smoking status: Former Smoker    Smokeless tobacco: Former User   Substance and Sexual Activity    Alcohol use: Not Currently    Drug use: Never    Sexual activity: Not on file     Review of Systems   Constitutional: Negative for activity change, appetite change, chills, fatigue and fever.   HENT: Negative for congestion,  sinus pressure and sinus pain.    Respiratory: Negative for cough, chest tightness, shortness of breath and wheezing.    Cardiovascular: Negative for chest pain, palpitations and leg swelling.   Gastrointestinal: Negative for abdominal pain, constipation, diarrhea, nausea and vomiting.   Genitourinary: Negative for difficulty urinating.   Musculoskeletal: Positive for arthralgias. Negative for myalgias.   Skin: Negative for rash and wound.   Neurological: Negative for dizziness, weakness, light-headedness, numbness and headaches.   Psychiatric/Behavioral: Negative for agitation and confusion.     Objective:     Vital Signs (Most Recent):  Temp: 97.9 °F (36.6 °C) (04/17/20 0918)  Pulse: 61 (04/17/20 0918)  Resp: 16 (04/17/20 0918)  BP: 120/64 (04/17/20 0918)  SpO2: 95 % (04/17/20 0918) Vital Signs (24h Range):  Temp:  [97.9 °F (36.6 °C)] 97.9 °F (36.6 °C)  Pulse:  [61-80] 61  Resp:  [16-21] 16  SpO2:  [94 %-98 %] 95 %  BP: (120-138)/(58-86) 120/64     Weight: 72.6 kg (160 lb)  Body mass index is 21.7 kg/m².    Physical Exam   Constitutional: He is oriented to person, place, and time. He appears well-developed and well-nourished. No distress.   Elderly man   HENT:   Head: Normocephalic and atraumatic.   Nose: Nose normal.   Mouth/Throat: Oropharynx is clear and moist.   Eyes: Conjunctivae and EOM are normal. No scleral icterus.   Neck: Neck supple. No JVD present. No thyromegaly present.   Cardiovascular: Normal rate, regular rhythm and normal heart sounds. Exam reveals no gallop and no friction rub.   No murmur heard.  Pulmonary/Chest: Effort normal and breath sounds normal. No stridor. No respiratory distress. He has no wheezes. He has no rales.   Room air   Abdominal: Soft. Bowel sounds are normal. He exhibits no distension and no mass. There is no tenderness. There is no guarding.   Musculoskeletal: He exhibits edema (trace bilateral shins), tenderness (R groin tenderness to palpation) and deformity (R leg  shortened and rotated).   Lymphadenopathy:     He has no cervical adenopathy.   Neurological: He is alert and oriented to person, place, and time.   Skin: Skin is warm. He is not diaphoretic.   Nursing note and vitals reviewed.        CRANIAL NERVES     CN III, IV, VI   Extraocular motions are normal.        Significant Labs: All pertinent labs within the past 24 hours have been reviewed.    Significant Imaging: I have reviewed and interpreted all pertinent imaging results/findings within the past 24 hours.

## 2020-04-17 NOTE — ASSESSMENT & PLAN NOTE
Fall at home causing fracture. With history of multiple malignancies- unknown if pathologic. CT pending.   Ortho consulted  Given multiple strokes, heart attacks with now elevated troponin, CHF with unknown EF, renal insufficiency, Afib, Cardiology consulted for pre-op assessment-- high risk for surgery  Pain control with IV dilaudid for now-- reassess after surgery  Bowel regimen after surgery  [facility] today  PT, OT after surgery  On home hospice-- will need to discuss dispo after fall

## 2020-04-17 NOTE — CONSULTS
"Orthopedics Consult Note     CC: right hip pain     HPI: Mr Richard Rome is a 79 y.o. man with prostate cancer, CHF, stroke who presents after fall. He lives at home alone in an apartment. He walks with a rollator at baseline but does have issues with balance. He fell last Friday but states he was able to ambulate after. Then he fell again today when he was getting out of his chair into his walker. He denies hitting his head, but he said that everything "went black." He denies chest pain, shortness of breath, palpitations, presyncope prior to the fall. He denies biting his tongue and bowel/bladder incontinence. He is complaining of pain to the right groin and hip that is worse with motion or being moved in the bed.  Had dose of dilaudid immediately prior to exam an is more comfortable. He denies pain elsewhere. He says he is also having difficulty urinating and has been trying to urinate for the last 45 minutes unsuccessfully     In ED, found to have acute displaced intertrochanteric fracture of the proximal right femur. Also with Afib on arrival-- unknown if new vs old, elevated troponin without symptoms of ACS, and impaired renal function- unknown if new vs old. He has a prior history of renal cell carcinoma s/p nephrectomy (not active), bladder cancer s/p TURBT (also not thought to be active), and prostate cancer with metastatic disease to bones (follows at [facility]). He also says he has CHF-- unknown last EF but denies shortness of breath, orthopnea, PND, leg swelling, abdominal distension.     Type and screen done earlier today     Past Medical History:   Diagnosis Date    Arthritis     Bladder cancer 12/22/2017    s/p TURBT    Cataract     CHF (congestive heart failure)     Depression     Hypertension     Meningitis     Prostate cancer     with bone metastases; on hospice for this    Prostate cancer metastatic to bone 4/17/2020    Renal cell carcinoma     s/p nephrectomy    Renal insufficiency " 4/17/2020    Stroke 2014     Past Surgical History:   Procedure Laterality Date    KNEE SURGERY  2014    NEPHRECTOMY  2013     Social History     Tobacco Use    Smoking status: Former Smoker    Smokeless tobacco: Former User   Substance Use Topics    Alcohol use: Not Currently    Drug use: Never     Family History   Problem Relation Age of Onset    Cancer Mother         throat    Heart disease Mother     Heart disease Father     Heart disease Brother          Current Facility-Administered Medications:     0.9%  NaCl infusion, , Intravenous, Continuous, Mitchel Herrera MD, Last Rate: 125 mL/hr at 04/17/20 1047    brimonidine 0.2% ophthalmic solution 1 drop, 1 drop, Both Eyes, Q8H, Mitchel Herrera MD    hydromorphone (PF) injection 1 mg, 1 mg, Intravenous, Q1H PRN, Beth Elaine MD    ondansetron injection 4 mg, 4 mg, Intravenous, Q6H PRN, Beth Elaine MD    promethazine (PHENERGAN) 6.25 mg in dextrose 5 % 50 mL IVPB, 6.25 mg, Intravenous, Q6H PRN, Beth Elaine MD    sodium chloride 0.9% flush 10 mL, 10 mL, Intravenous, PRN, Mitchel Herrera MD    Physical Exam:    Temp:  [97.9 °F (36.6 °C)] 97.9 °F (36.6 °C)  Pulse:  [61-80] 61  Resp:  [16-21] 16  SpO2:  [94 %-98 %] 95 %  BP: (120-138)/(58-86) 120/64    General: Patient is sleep but arousable --   alert, awake. Mood and affect are appropriate.  Patient does not appear to be in any distress, denies any constitutional symptoms and appears stated age.   HEENT: Pupils are equal and round, sclera are not injected. External examination of ears and nose reveals no abnormalities. Cranial nerves II-X are grossly intact  Skin:  no rashes, abrasions or open wounds on the affected extremity   No SCDs, TEDs in place  Resp: No respiratory distress or audible wheezing   CV: 2+  pulses, all extremities warm and well perfused   No severino, attempting to urinate in urinal at time of exam   Right Lower Extremity   No abrasions or open wounds   No swelling  or tenderness to calf   Ltsi s/s/sp/dp/t  + ehl/fhl/ta/gs  2+ DP      Imaging: 3 views right hip: displaced intertroch fracture with displacement of lesser troch. Visible revision TKA stem that ends approximately 174 mm from the GT.    2 views right femur: s/p revision TKA with long stemmed implant. No evidence of loosening or displacement   CT R hip: no evidence of soft tissue mass/metastasis to femur. No contrast used because of elevated BUN/Cr    Hemoglobin   Date Value Ref Range Status   04/17/2020 9.1 (L) 14.0 - 18.0 g/dL Final     Hematocrit   Date Value Ref Range Status   04/17/2020 29.9 (L) 40.0 - 54.0 % Final     Platelets   Date Value Ref Range Status   04/17/2020 254 150 - 350 K/uL Final     Sodium   Date Value Ref Range Status   04/17/2020 138 136 - 145 mmol/L Final     Potassium   Date Value Ref Range Status   04/17/2020 4.2 3.5 - 5.1 mmol/L Final     Chloride   Date Value Ref Range Status   04/17/2020 102 95 - 110 mmol/L Final     BUN, Bld   Date Value Ref Range Status   04/17/2020 70 (H) 8 - 23 mg/dL Final     Creatinine   Date Value Ref Range Status   04/17/2020 1.8 (H) 0.5 - 1.4 mg/dL Final     Glucose   Date Value Ref Range Status   04/17/2020 108 70 - 110 mg/dL Final      A/P: 79 y.o. male with right intertrochanteric femur fracture    - Cleared with high risk for fixation of intertroch fracture by cardiology. Echo ordered but does not need to be complete prior to surgery per cardiology but anesthesia would prefer for it to be complete before.    - The spectrum of treatment options were discussed with the patient, including nonoperative and operative options.  After thorough discussion, the patient has elected to undergo surgical treatment to include:    Right intertrochanteric fracture ORIF with sliding hip screw     We have discussed the surgery and anticipated recovery.  He understands that there may be limited mobility up to several weeks after surgery depending on procedures that are  performed at the time of surgery.    The details of the surgical procedure were explained, including the location of probable incisions and a description of likely hardware and/or grafts to be used.  The patient understands the likely convalescence after surgery, in particular the expected postop rehab and recovery course. Alternatives both operative and non-operative with associated risks and benefits discussed. The outlined risks and potential complications of the proposed procedure include but are not limited to: infection, poor wound healing, scarring, stiffness, swelling, continued or recurrent pain, instability, hardware or prosthetic failure, symptomatic hardware requiring removal, weakness, neurovascular injury, numbness, chronic regional pain disorder, arthritis, fracture, blood clot formation, pneumonia, inability to return to previous level of activity, anesthetic or regional block complication up to death, need for additional procedure as indicated, and potential need for further surgery.    Discussed complications and mortality possible after hip fracture and associated fixation     He was also informed and understands the risks of surgery are greater for patients with a current condition or history of heart disease, obesity, clotting disorders, recurrent infections, steroid use, current or past smoking, and factors such as sedentary lifestyle and noncompliance with medications, therapy or follow-up. The degree of the increased risk is hard to estimate with any degree of precision.    All questions were answered. The patient has verbalized understanding of these issues and wishes to proceed as discussed.   Will proceed with medical clearance.    - Discussed the above with his daughter, Odilia who is in agreement with the plan. Will call her when surgery is complete.

## 2020-04-17 NOTE — HPI
"Mr Richard Rome is a 79 y.o. man with prostate cancer, CHF, stroke who presents after fall. He lives at home alone in an apartment. He walks with a rollator at baseline but does have issues with balance. He fell on Friday (unclear if he means last Friday or this Friday). Then he fell again today when he was getting out of his chair into his walker. He denies hitting his head, but he said that everything "went black." He denies chest pain, shortness of breath, palpitations, presyncope prior to the fall. He denies biting his tongue and bowel/bladder incontinence. He currently has pain in his right groin that he cannot describe more than "it hurts." He denies pain elsewhere. He says he is also having difficulty urinating and has been trying to urinate for the last 45 minutes.     In ED, found to have acute mildly displaced intertrochanteric fracture of the proximal right femur. Also with Afib on arrival-- unknown if new vs old, elevated troponin without symptoms of ACS, and impaired renal function- unknown if new vs old. He has a prior history of renal cell carcinoma s/p nephrectomy (not active), bladder cancer s/p TURBT (also not thought to be active), and prostate cancer with metastatic disease to bones (follows at [facility]). He also says he has CHF-- unknown last EF but denies shortness of breath, orthopnea, PND, leg swelling, abdominal distension.   Called daughter Odilia Rome. Daughter says he fell a week ago, 2 days ago. She says he is on hospice and has a hospice nurse that checks on him. He is on hospice for prostate cancer for the last year. He has not received any treatment for prostate cancer. He has multiple strokes with no residual deficits. He has had multiple heart attacks in the past- last was several years ago. Daughter confirms that he does have a history of Afib but does not know about any kidney dysfunction.   "

## 2020-04-17 NOTE — ASSESSMENT & PLAN NOTE
Unknown EF  Due to CAD  Mild lower extremity edema currently, but does not seem to have active CHF exacerbation  TTE pending   Waiting for med list from daughter to see if he is on asa/BB/ACEi/ARB/statin at home

## 2020-04-17 NOTE — ASSESSMENT & PLAN NOTE
Per ortho. Will check echo and trend troponin. Given age and possible NSTEMI will clear for surgery at high cardiac risk

## 2020-04-17 NOTE — CONSULTS
[facility]  Cardiology  Consult Note    Patient Name: Richard Rome  MRN: [id number]  Admission Date: 4/17/2020  Hospital Length of Stay: 0 days  Code Status: No Order   Attending Provider: Beth Elaine MD   Consulting Provider: Oscar Garza MD  Primary Care Physician: Edvin Reyes MD  Principal Problem:Intertrochanteric fracture of right femur    Patient information was obtained from patient and ER records.     Consults  Subjective:     Chief Complaint:  Elevated troponin     HPI:   79-year-old male with past medical history of Stroke, Congested Heart Failure, and Hypertension presenting for right hip pain status post witnessed fall today. Patient states he was trying to get up from chair and fell. No loss of consciousness or head trauma. He also complains of some chest pain prior to fall. He denies sore throat, cough, shortness of breath, fever, vomiting, diarrhea, sore throat, back pain, neck pain, headache, or any further complaints. He is on oxygen PRN. No modifying factors.     Dx with hip Fx  Denies CP or SOB  EKG A-fib 63 IVCD - no acute STT changes - no recent EKG for comparison  Troponin 0.17  Cr 1.8  COVID-19 pending    Past Medical History:   Diagnosis Date    Arthritis     Bladder cancer     Bladder cancer 12/22/2017    Cataract     CHF (congestive heart failure)     Depression     Hypertension     Meningitis     Renal insufficiency 4/17/2020    Stroke 2014       Past Surgical History:   Procedure Laterality Date    KNEE SURGERY  2014    NEPHRECTOMY  2013       Review of patient's allergies indicates:   Allergen Reactions    Iodine and iodide containing products Swelling       No current facility-administered medications on file prior to encounter.      Current Outpatient Medications on File Prior to Encounter   Medication Sig    atenolol (TENORMIN) 25 MG tablet TK 1 T PO  QD    brimonidine 0.2% (ALPHAGAN) 0.2 % Drop INSTILL ONE DROP INTO OU BID    ciprofloxacin  HCl (CIPRO) 500 MG tablet TK 1 T PO  BID. START THE DAY PRIOR TO BIOPSY     mg capsule TK ONE C PO ONCE D    dorzolamide-timolol 2-0.5% (COSOPT) 22.3-6.8 mg/mL ophthalmic solution INSTILL 1 GTT IN OU BID    furosemide (LASIX) 40 MG tablet TK 1 T PO  ONCE D    gabapentin (NEURONTIN) 100 MG capsule TK 2 C QHS PRN FOR FOOT P    hydrocodone-acetaminophen 5-325mg (NORCO) 5-325 mg per tablet TK 1 TO 2 TS PO Q 4 H PRN P    lisinopril 10 MG tablet TK 1 T PO  ONCE D    tamsulosin (FLOMAX) 0.4 mg Cp24 TK ONE C PO  QHS FOR PROSTATE     Family History     None        Tobacco Use    Smoking status: Former Smoker    Smokeless tobacco: Former User   Substance and Sexual Activity    Alcohol use: Not on file    Drug use: Not on file    Sexual activity: Not on file     Review of Systems   Constitution: Negative for decreased appetite.   HENT: Negative for ear discharge.    Eyes: Negative for blurred vision.   Endocrine: Negative for polyphagia.   Skin: Negative for nail changes.   Genitourinary: Negative for bladder incontinence.   Neurological: Negative for aphonia.   Psychiatric/Behavioral: Negative for hallucinations.   Allergic/Immunologic: Negative for hives.     Objective:     Vital Signs (Most Recent):  Temp: 97.9 °F (36.6 °C) (04/17/20 0608)  Pulse: 61 (04/17/20 0839)  Resp: 16 (04/17/20 0839)  BP: (!) 120/58 (04/17/20 0832)  SpO2: 98 % (04/17/20 0839) Vital Signs (24h Range):  Temp:  [97.9 °F (36.6 °C)] 97.9 °F (36.6 °C)  Pulse:  [61-80] 61  Resp:  [16-18] 16  SpO2:  [97 %-98 %] 98 %  BP: (120-138)/(58-86) 120/58     Weight: 72.6 kg (160 lb)  Body mass index is 21.7 kg/m².    SpO2: 98 %  O2 Device (Oxygen Therapy): room air    No intake or output data in the 24 hours ending 04/17/20 0901    Lines/Drains/Airways     Peripheral Intravenous Line                 Peripheral IV - Single Lumen 04/17/20 0634 20 G Right Forearm less than 1 day                Physical Exam   Constitutional: He is oriented to person,  place, and time. He appears well-developed and well-nourished.   HENT:   Head: Normocephalic and atraumatic.   Eyes: Pupils are equal, round, and reactive to light. Conjunctivae are normal.   Neck: Normal range of motion. Neck supple.   Cardiovascular: Normal rate, normal heart sounds and intact distal pulses. An irregularly irregular rhythm present.   Pulmonary/Chest: Effort normal and breath sounds normal.   Abdominal: Soft. Bowel sounds are normal.   Musculoskeletal: Normal range of motion.   Neurological: He is alert and oriented to person, place, and time.   Skin: Skin is warm and dry.       Significant Labs: All pertinent lab results from the last 24 hours have been reviewed.    Significant Imaging: Echocardiogram: 2D echo with color flow doppler: No results found for this or any previous visit.    Assessment and Plan:     * Intertrochanteric fracture of right femur  Per ortho. Will check echo and trend troponin. Given age and possible NSTEMI will clear for surgery at high cardiac risk    Atrial fibrillation  New Dx. Duration unknown. Rate controlled. Poor candidate for long term anticoagulation. Check echo. Observe on telemetry    CHF (congestive heart failure)  No prior EF. Volume status appears stable    History of renal cell carcinoma  Per primary    Elevated troponin  Unclear if this is demand ischemia or ACS. Will hold off anticoagulation in the event he needs surgery. Check echo. Trend troponin. Denies CP. EKG without acute ischemic changes    Bladder cancer  Per primary        VTE Risk Mitigation (From admission, onward)    None          Thank you for your consult. I will follow-up with patient. Please contact us if you have any additional questions.    Oscar Garza MD  Cardiology   [facility]

## 2020-04-18 PROBLEM — E87.5 HYPERKALEMIA: Status: ACTIVE | Noted: 2020-04-18

## 2020-04-18 LAB
ANION GAP SERPL CALC-SCNC: 10 MMOL/L (ref 8–16)
ANION GAP SERPL CALC-SCNC: 9 MMOL/L (ref 8–16)
BASOPHILS # BLD AUTO: 0.01 K/UL (ref 0–0.2)
BASOPHILS NFR BLD: 0.2 % (ref 0–1.9)
BUN SERPL-MCNC: 63 MG/DL (ref 8–23)
BUN SERPL-MCNC: 64 MG/DL (ref 8–23)
CALCIUM SERPL-MCNC: 8.7 MG/DL (ref 8.7–10.5)
CALCIUM SERPL-MCNC: 8.9 MG/DL (ref 8.7–10.5)
CHLORIDE SERPL-SCNC: 105 MMOL/L (ref 95–110)
CHLORIDE SERPL-SCNC: 107 MMOL/L (ref 95–110)
CO2 SERPL-SCNC: 22 MMOL/L (ref 23–29)
CO2 SERPL-SCNC: 24 MMOL/L (ref 23–29)
CREAT SERPL-MCNC: 1.5 MG/DL (ref 0.5–1.4)
CREAT SERPL-MCNC: 1.6 MG/DL (ref 0.5–1.4)
DIFFERENTIAL METHOD: ABNORMAL
EOSINOPHIL # BLD AUTO: 0.1 K/UL (ref 0–0.5)
EOSINOPHIL NFR BLD: 2.3 % (ref 0–8)
ERYTHROCYTE [DISTWIDTH] IN BLOOD BY AUTOMATED COUNT: 16.9 % (ref 11.5–14.5)
EST. GFR  (AFRICAN AMERICAN): 47 ML/MIN/1.73 M^2
EST. GFR  (AFRICAN AMERICAN): 50 ML/MIN/1.73 M^2
EST. GFR  (NON AFRICAN AMERICAN): 40 ML/MIN/1.73 M^2
EST. GFR  (NON AFRICAN AMERICAN): 44 ML/MIN/1.73 M^2
GLUCOSE SERPL-MCNC: 112 MG/DL (ref 70–110)
GLUCOSE SERPL-MCNC: 116 MG/DL (ref 70–110)
HCT VFR BLD AUTO: 29.9 % (ref 40–54)
HGB BLD-MCNC: 8.6 G/DL (ref 14–18)
IMM GRANULOCYTES # BLD AUTO: 0.04 K/UL (ref 0–0.04)
IMM GRANULOCYTES NFR BLD AUTO: 0.7 % (ref 0–0.5)
LYMPHOCYTES # BLD AUTO: 1.2 K/UL (ref 1–4.8)
LYMPHOCYTES NFR BLD: 19.1 % (ref 18–48)
MCH RBC QN AUTO: 25.3 PG (ref 27–31)
MCHC RBC AUTO-ENTMCNC: 28.8 G/DL (ref 32–36)
MCV RBC AUTO: 88 FL (ref 82–98)
MONOCYTES # BLD AUTO: 0.6 K/UL (ref 0.3–1)
MONOCYTES NFR BLD: 9 % (ref 4–15)
NEUTROPHILS # BLD AUTO: 4.2 K/UL (ref 1.8–7.7)
NEUTROPHILS NFR BLD: 68.7 % (ref 38–73)
NRBC BLD-RTO: 0 /100 WBC
PLATELET # BLD AUTO: ABNORMAL K/UL (ref 150–350)
PMV BLD AUTO: ABNORMAL FL (ref 9.2–12.9)
POTASSIUM SERPL-SCNC: 4.7 MMOL/L (ref 3.5–5.1)
POTASSIUM SERPL-SCNC: 5.8 MMOL/L (ref 3.5–5.1)
RBC # BLD AUTO: 3.4 M/UL (ref 4.6–6.2)
SODIUM SERPL-SCNC: 138 MMOL/L (ref 136–145)
SODIUM SERPL-SCNC: 139 MMOL/L (ref 136–145)
TROPONIN I SERPL DL<=0.01 NG/ML-MCNC: 0.16 NG/ML (ref 0–0.03)
WBC # BLD AUTO: 6.14 K/UL (ref 3.9–12.7)

## 2020-04-18 PROCEDURE — 21400001 HC TELEMETRY ROOM

## 2020-04-18 PROCEDURE — 84484 ASSAY OF TROPONIN QUANT: CPT

## 2020-04-18 PROCEDURE — 93010 ELECTROCARDIOGRAM REPORT: CPT | Mod: 76,GW,, | Performed by: INTERNAL MEDICINE

## 2020-04-18 PROCEDURE — 80048 BASIC METABOLIC PNL TOTAL CA: CPT | Mod: 91

## 2020-04-18 PROCEDURE — 25000003 PHARM REV CODE 250: Performed by: HOSPITALIST

## 2020-04-18 PROCEDURE — 93010 EKG 12-LEAD: ICD-10-PCS | Mod: GW,,, | Performed by: INTERNAL MEDICINE

## 2020-04-18 PROCEDURE — 25000003 PHARM REV CODE 250: Performed by: ORTHOPAEDIC SURGERY

## 2020-04-18 PROCEDURE — 63700000 PHARM REV CODE 250 ALT 637 W/O HCPCS: Performed by: HOSPITALIST

## 2020-04-18 PROCEDURE — 80048 BASIC METABOLIC PNL TOTAL CA: CPT

## 2020-04-18 PROCEDURE — 63600175 PHARM REV CODE 636 W HCPCS: Performed by: HOSPITALIST

## 2020-04-18 PROCEDURE — 36415 COLL VENOUS BLD VENIPUNCTURE: CPT

## 2020-04-18 PROCEDURE — 85025 COMPLETE CBC W/AUTO DIFF WBC: CPT

## 2020-04-18 PROCEDURE — 93010 ELECTROCARDIOGRAM REPORT: CPT | Mod: GW,,, | Performed by: INTERNAL MEDICINE

## 2020-04-18 PROCEDURE — 63600175 PHARM REV CODE 636 W HCPCS: Performed by: ORTHOPAEDIC SURGERY

## 2020-04-18 PROCEDURE — 84484 ASSAY OF TROPONIN QUANT: CPT | Mod: 91

## 2020-04-18 PROCEDURE — 93005 ELECTROCARDIOGRAM TRACING: CPT

## 2020-04-18 RX ORDER — NITROGLYCERIN 0.4 MG/1
0.4 TABLET SUBLINGUAL EVERY 5 MIN PRN
Status: DISCONTINUED | OUTPATIENT
Start: 2020-04-18 | End: 2020-04-18

## 2020-04-18 RX ORDER — SODIUM POLYSTYRENE SULFONATE 4.1 MEQ/G
30 POWDER, FOR SUSPENSION ORAL; RECTAL
Status: DISCONTINUED | OUTPATIENT
Start: 2020-04-18 | End: 2020-04-18

## 2020-04-18 RX ORDER — ACETAMINOPHEN 500 MG
1000 TABLET ORAL EVERY 8 HOURS
Status: DISCONTINUED | OUTPATIENT
Start: 2020-04-18 | End: 2020-04-21 | Stop reason: HOSPADM

## 2020-04-18 RX ORDER — OXYCODONE HYDROCHLORIDE 5 MG/1
5 TABLET ORAL EVERY 6 HOURS PRN
Status: DISCONTINUED | OUTPATIENT
Start: 2020-04-18 | End: 2020-04-21 | Stop reason: HOSPADM

## 2020-04-18 RX ORDER — HYDROMORPHONE HYDROCHLORIDE 2 MG/ML
1 INJECTION, SOLUTION INTRAMUSCULAR; INTRAVENOUS; SUBCUTANEOUS EVERY 6 HOURS PRN
Status: DISCONTINUED | OUTPATIENT
Start: 2020-04-18 | End: 2020-04-19

## 2020-04-18 RX ADMIN — POLYETHYLENE GLYCOL 3350 17 G: 17 POWDER, FOR SOLUTION ORAL at 09:04

## 2020-04-18 RX ADMIN — GUAIFENESIN AND DEXTROMETHORPHAN HYDROBROMIDE 1 TABLET: 600; 30 TABLET, EXTENDED RELEASE ORAL at 12:04

## 2020-04-18 RX ADMIN — DOCUSATE SODIUM AND SENNOSIDES 1 TABLET: 50; 8.6 TABLET, FILM COATED ORAL at 09:04

## 2020-04-18 RX ADMIN — HEPARIN SODIUM 5000 UNITS: 5000 INJECTION INTRAVENOUS; SUBCUTANEOUS at 02:04

## 2020-04-18 RX ADMIN — GUAIFENESIN AND DEXTROMETHORPHAN HYDROBROMIDE 1 TABLET: 600; 30 TABLET, EXTENDED RELEASE ORAL at 09:04

## 2020-04-18 RX ADMIN — ACETAMINOPHEN 1000 MG: 500 TABLET ORAL at 09:04

## 2020-04-18 RX ADMIN — HEPARIN SODIUM 5000 UNITS: 5000 INJECTION INTRAVENOUS; SUBCUTANEOUS at 09:04

## 2020-04-18 RX ADMIN — BRIMONIDINE TARTRATE 1 DROP: 2 SOLUTION OPHTHALMIC at 02:04

## 2020-04-18 RX ADMIN — BRIMONIDINE TARTRATE 1 DROP: 2 SOLUTION OPHTHALMIC at 09:04

## 2020-04-18 RX ADMIN — SODIUM POLYSTYRENE SULFONATE 30 G: 1 POWDER ORAL; RECTAL at 01:04

## 2020-04-18 RX ADMIN — HYDROCODONE BITARTRATE AND ACETAMINOPHEN 1 TABLET: 5; 325 TABLET ORAL at 05:04

## 2020-04-18 RX ADMIN — HYDROMORPHONE HYDROCHLORIDE 1 MG: 2 INJECTION INTRAMUSCULAR; INTRAVENOUS; SUBCUTANEOUS at 10:04

## 2020-04-18 RX ADMIN — BRIMONIDINE TARTRATE 1 DROP: 2 SOLUTION OPHTHALMIC at 05:04

## 2020-04-18 RX ADMIN — HEPARIN SODIUM 5000 UNITS: 5000 INJECTION INTRAVENOUS; SUBCUTANEOUS at 05:04

## 2020-04-18 RX ADMIN — ACETAMINOPHEN 1000 MG: 500 TABLET ORAL at 02:04

## 2020-04-18 RX ADMIN — CEFAZOLIN SODIUM 1 G: 1 SOLUTION INTRAVENOUS at 05:04

## 2020-04-18 NOTE — PROGRESS NOTES
"[facility]  Progress Note    Patient Name: Richard Rome  MRN: [id number]  Patient Class: IP- Inpatient   Admission Date: 4/17/2020  Length of Stay: 1 days  Attending Physician: Beth Elaine MD  Primary Care Provider: Edvin Reyes MD        Subjective:     Principal Problem:Intertrochanteric fracture of right femur        HPI:  Mr Richard Rome is a 79 y.o. man with prostate cancer, CHF, stroke who presents after fall. He lives at home alone in an apartment. He walks with a rollator at baseline but does have issues with balance. He fell on Friday (unclear if he means last Friday or this Friday). Then he fell again today when he was getting out of his chair into his walker. He denies hitting his head, but he said that everything "went black." He denies chest pain, shortness of breath, palpitations, presyncope prior to the fall. He denies biting his tongue and bowel/bladder incontinence. He currently has pain in his right groin that he cannot describe more than "it hurts." He denies pain elsewhere. He says he is also having difficulty urinating and has been trying to urinate for the last 45 minutes.     In ED, found to have acute mildly displaced intertrochanteric fracture of the proximal right femur. Also with Afib on arrival-- unknown if new vs old, elevated troponin without symptoms of ACS, and impaired renal function- unknown if new vs old. 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 He also says he has CHF-- unknown last EF but denies shortness of breath, orthopnea, PND, leg swelling, abdominal distension.     Called daughter Odilia Rome. Daughter says he fell a week ago, 2 days ago. She says he is on hospice and has a hospice nurse that checks on him. He is on hospice for prostate cancer for the last " year. He has not received any treatment for prostate cancer. He has multiple strokes with no residual deficits. He has had multiple heart attacks in the past- last was several years ago. Daughter confirms that he does have a history of Afib but does not know about any kidney dysfunction.     Overview/Hospital Course:  Admitted with R proximal femur intertrochanteric fracture. Also with Afib rate controlled, elevated troponin unknown if ACS vs demand ischemia, and impaired renal function that is likely chronic. Cardiology consulted. High risk for surgery. Ortho consulted. Taken to OR 4/17 for R hip ORIF using dynamic hip screw. Post op, complains of chest pain.      Interval History: Pain in hip is controlled. Complains of pressure like pain on the L side of his chest with no radiation that is worse with deep breathing and coughing. No shortness of breath, diaphoresis, palpitations.     Review of Systems   Constitutional: Negative for chills and fever.   Respiratory: Positive for chest tightness. Negative for cough and shortness of breath.    Cardiovascular: Positive for chest pain. Negative for leg swelling.   Gastrointestinal: Positive for constipation. Negative for abdominal pain, diarrhea, nausea and vomiting.     Objective:     Vital Signs (Most Recent):  Temp: 99.1 °F (37.3 °C) (04/18/20 0717)  Pulse: (!) 59 (04/18/20 0717)  Resp: 17 (04/18/20 0717)  BP: 113/74 (04/18/20 0717)  SpO2: 96 % (04/18/20 0717) Vital Signs (24h Range):  Temp:  [97.6 °F (36.4 °C)-99.1 °F (37.3 °C)] 99.1 °F (37.3 °C)  Pulse:  [59-72] 59  Resp:  [16-18] 17  SpO2:  [90 %-100 %] 96 %  BP: (109-135)/(59-74) 113/74     Weight: 72.1 kg (159 lb)  Body mass index is 21.56 kg/m².    Intake/Output Summary (Last 24 hours) at 4/18/2020 0940  Last data filed at 4/18/2020 0000  Gross per 24 hour   Intake --   Output 1760 ml   Net -1760 ml      Physical Exam   Constitutional: No distress.   Thin elderly man   HENT:   Head: Normocephalic and  atraumatic.   Mouth/Throat: Oropharynx is clear and moist.   Cardiovascular: Normal rate, regular rhythm, normal heart sounds and intact distal pulses. Exam reveals no gallop and no friction rub.   No murmur heard.  No pain with palpation of chest wall   Pulmonary/Chest: Effort normal and breath sounds normal. No stridor. No respiratory distress. He has no wheezes. He has no rales.   Room air   Abdominal: Soft. Bowel sounds are normal. He exhibits no distension and no mass. There is no tenderness. There is no guarding.   Musculoskeletal: He exhibits edema and tenderness (L lateral thigh dressing in place, tender around dressing with mild swelling).   Neurological: He is alert.   Skin: Skin is warm and dry. He is not diaphoretic.   Nursing note and vitals reviewed.      Significant Labs: All pertinent labs within the past 24 hours have been reviewed.    Significant Imaging: I have reviewed and interpreted all pertinent imaging results/findings within the past 24 hours.      Assessment/Plan:      * Intertrochanteric fracture of right femur  Fall at home causing fracture. With history of multiple malignancies- unknown if pathologic. CT pending.   Ortho consulted  Given multiple strokes, heart attacks with now elevated troponin, CHF with unknown EF, renal insufficiency, Afib, Cardiology consulted for pre-op assessment-- high risk for surgery  Pain control- scheduled tylenol, PRN oxycodone, PRN dilaudid  Bowel regimen  PT, OT   On home hospice-- will need to discuss dispo after PT, OT consults      Hyperkalemia  K 5.8 on 4/18  EKG pending  Kayexalate ordered      Pulmonary hypertension  As noted on TTE  Due to L heart disease (WHO group 2)      Coronary artery disease involving native coronary artery of native heart without angina pectoris  History of multiple MI, last was a few years ago per daughter  Unknown home med list  With elevated troponin/possible NSTEMI on admit      Prostate cancer metastatic to bone  No  active or prior treatment per daughter  Follows at [facility]  On hospice for this for the past year        Atrial fibrillation  Per daughter, this is not new  Unknown if he is on rate/rhythm control Rx or anticoagulation at home. Do not have home med list  Rate controlled currently without Rx  Poor candidate for anticoagulation given falls at home, but CHADS-VASc is high (CHF, HTN, age, stroke history)  Monitor HR      Chronic diastolic congestive heart failure  Unknown EF  Due to CAD  Mild lower extremity edema currently, but does not seem to have active CHF exacerbation  TTE with DD, normal EF  No BB due to bradycardia  No ACEi/ARB due to renal insufficiency and hyperkalemia  Will need to see home med list to see if he takes asa/statin at home       Fall  Causing hip fracture  PT, OT    History of renal cell carcinoma  S/p L nephro ureterectomy  Not currently active per daughter      Elevated troponin  Trop 0.173 on admit. EKG no acute ST-T changes  TTE pending   He has a history of MI in the past and CAD with ischemic cardiomyopathy/CHF (per daughter)  Patient denies ACS symptoms prior to fall. Unclear if this is NSTEMI or demand ischemia  Cardiology consulted   Do not start hep gtt/asa/plavix per Cardiology  Not a good candidate for invasive procedure  Complaining of chest pain on 4/18-- check troponin, EKG, PRN nitro      Renal insufficiency  Cr 1.8 on admit, unknown if this is acute or chronic. Does have solitary kidney after history of RCC s/p nephrectomy  IVF while NPO, but cautious due to CHF history  Place severino  Monitor-- suspect this is chronic  Avoid nephrotoxins, renal dose all meds      Anemia  No bleeding noted. Unknown if chronic but suspect it is. Monitor post op-- stable      History of bladder cancer  Not active per daughter  Per last Onc note (in [facility], unable to access [facility] records)-- TCC TA, G1 22 and has been treated with TUR resection         VTE Risk Mitigation (From  admission, onward)         Ordered     heparin (porcine) injection 5,000 Units  Every 8 hours      04/17/20 1629     IP VTE HIGH RISK PATIENT  Once      04/17/20 1012     Place sequential compression device  Until discontinued      04/17/20 1012                EKG reviewed. Afib, no RVR, no ischemic changes. Troponin pending. Tried to give SL NTG but patient says it makes him diaphoretic and pass out. Added nitrates to problem list. Given dilaudid instead.     Called and updated Odilia Rome. Notified her of plans for treating chest pain. All questions answered.     Beth Elaine MD  Department of Hospital Medicine   [facility]

## 2020-04-18 NOTE — TRANSFER OF CARE
Anesthesia Transfer of Care Note    Patient: Richard Rome    Procedure(s) Performed: Procedure(s) (LRB):  ORIF, HIP, USING DYNAMIC HIP SCREW (Right)    Patient location: PACU    Anesthesia Type: general    Transport from OR: Transported from OR on 6-10 L/min O2 by face mask with adequate spontaneous ventilation    Post pain: adequate analgesia    Post assessment: no apparent anesthetic complications and tolerated procedure well    Post vital signs: stable    Level of consciousness: responds to stimulation and sedated    Nausea/Vomiting: no nausea/vomiting    Complications: none    Transfer of care protocol was followed      Last vitals:   Visit Vitals  /61 (BP Location: Right arm, Patient Position: Lying)   Pulse 61   Temp 36.7 °C (98.1 °F) (Skin)   Resp 17   Ht 6' (1.829 m)   Wt 72.1 kg (159 lb)   SpO2 100%   BMI 21.56 kg/m²

## 2020-04-18 NOTE — ASSESSMENT & PLAN NOTE
Unknown EF  Due to CAD  Mild lower extremity edema currently, but does not seem to have active CHF exacerbation  TTE with DD, normal EF  No BB due to bradycardia  No ACEi/ARB due to renal insufficiency and hyperkalemia  Will need to see home med list to see if he takes asa/statin at home

## 2020-04-18 NOTE — ASSESSMENT & PLAN NOTE
Per daughter, this is not new  Unknown if he is on rate/rhythm control Rx or anticoagulation at home. Do not have home med list  Rate controlled currently without Rx  Poor candidate for anticoagulation given falls at home, but CHADS-VASc is high (CHF, HTN, age, stroke history)  Monitor HR

## 2020-04-18 NOTE — NURSING
"Rogers removed at 06:15 this morning and patient has not voided in 6 hours. Patient stated "I want to try one more time." Multiple attempts made but patient was unsuccessful in voiding on his own. Bladder scan performed and 491 ml was the highest volume in bladder. Dr.Van Carlson notified, new orders to perform in and out cath x 1. In and out cath performed and 400 ml of yellow urine returned. Patient tolerated procedure well.   "

## 2020-04-18 NOTE — ASSESSMENT & PLAN NOTE
Not active per daughter  Per last Onc note (in [facility] system, unable to access [facility] records)-- TCC TA, G1 22 and has been treated with TUR resection

## 2020-04-18 NOTE — ANESTHESIA POSTPROCEDURE EVALUATION
Anesthesia Post Evaluation    Patient: Richard Rome    Procedure(s) Performed: Procedure(s) (LRB):  ORIF, HIP, USING DYNAMIC HIP SCREW (Right)    Final Anesthesia Type: general    Patient location during evaluation: PACU  Patient participation: Yes- Able to Participate  Level of consciousness: awake and alert  Post-procedure vital signs: reviewed and stable  Pain management: adequate  Airway patency: patent    PONV status at discharge: No PONV  Anesthetic complications: no      Cardiovascular status: blood pressure returned to baseline and hemodynamically stable  Respiratory status: unassisted and spontaneous ventilation  Hydration status: euvolemic  Follow-up not needed.          Vitals Value Taken Time   /74 4/18/2020  7:17 AM   Temp 37.3 °C (99.1 °F) 4/18/2020  7:17 AM   Pulse 59 4/18/2020  7:17 AM   Resp 17 4/18/2020  7:17 AM   SpO2 96 % 4/18/2020  7:17 AM         Event Time     Out of Recovery 19:59:33          Pain/Gordo Score: Pain Rating Prior to Med Admin: 5 (4/18/2020  5:30 AM)  Pain Rating Post Med Admin: 0 (4/17/2020  7:59 PM)  Gordo Score: 9 (4/17/2020  8:10 PM)

## 2020-04-18 NOTE — OP NOTE
Operative Note      Date of Procedure:   4/17/2020     Attending Physician:   Janet Amaro MD     Pre-Op Diagnosis:   Intertrochanteric fracture of right hip     Post-Op Diagnosis:   Intertrochanteric fracture of right hip     Procedure:   Closed reduction and internal fixation of the right proximal femur with sliding hip screw     Implants:  [place] 2 hole Omega 3 plate and associated screws     Estimated Blood Loss:   300 cc     Complications:   None     Tourniquet Time:   None       Specimens:   None     Drains:   None     Indications:   Richard Rome  is a 79-year-old male who presented to the ER after a fall with a right intertrochanteric hip fracture.  Risks and benefits of surgical fixation of the hip fracture were discussed with the patient and  family.   Presence of a long-stem revision total knee implant in the right femur limits implant options.  Written consent was obtained and medical clearance was obtained prior to surgical fixation.     Procedure in detail:  The patient was identified and marked in his room prior to being brought directly to the operating room because of Coronavirus restrictions.  He was  placed supine on the operating table and underwent general endotracheal anesthesia. All bony promineces were padded.  The left foot was placed in the leg matta and secured to the fracture table.  The well leg was placed in a well leg matta with all bony prominences carefully padded.    A time out was called confirming the correct site, side, patient procedure and that antibiotics had been given per hospital protocol.    Reduction was obtained under fluoroscopic guidance prior to prep.  After reduction was confirmed on AP and lateral views, the leg matta was secured into position and the right lower extremity  was prepped and draped in the normal sterile fashion. An incision was made over the lateral aspect of the femur.  A large incision was made as the preoperative plan was to use a 130 degree  8 hole plate per preoperative templating to allow overlap between the plate and proximal aspect of the revision total knee stem.  Flexion was carried down to the ITB band and the fascia gill, the vastus lateralis was retracted anteriorly over the femur.  A guidewire was passed through the lateral femur into the femoral neck and head.  Fluoroscopy was used throughout this process to localize optimal placement for the lag screw.  Once this was obtained, the guidewire was measured to 120 mm.  The lateral femur was overdrilled to 110 mm to allow for compression.  The screw was placed over the guidewire with good bite into the femoral head.  No 130 degree 8 hole plates were available so we attempted using a 135 degree 8 hole plate however there was about 2 or 3 cm between the lateral aspect of the femur and the distal aspect of the plate.  This plate was abandoned due to concern of screw cut-out if it was brought closer to the bone with cables or screw fixation.  It was switched out for a 2 hole 135 degree plate that did fit better to the bone.  Two cortical screws were placed into the plate and the compression screw was used to compress the fracture under fluoroscopic guidance.   Final fluoroscopic views were taken. All screws were noted to be of appropriate length and placement and reduction had been maintained throughout hardware application. The incisions were copiously irrigated. The deep fascial layers were closed in a layered fashion with #  0 vicryl followed by 2-0 vicryl and staples on the skin. Sterile dressings were applied.  The patient was extubated and recovered in the OR without complication.       All counts were correct at the end of the procedure.

## 2020-04-18 NOTE — ASSESSMENT & PLAN NOTE
Cr 1.8 on admit, unknown if this is acute or chronic. Does have solitary kidney after history of RCC s/p nephrectomy  IVF while NPO, but cautious due to CHF history  Place severino  Monitor-- suspect this is chronic  Avoid nephrotoxins, renal dose all meds

## 2020-04-18 NOTE — PLAN OF CARE
Problem: Fall Injury Risk  Goal: Absence of Fall and Fall-Related Injury  Outcome: Ongoing, Not Progressing     Problem: Adult Inpatient Plan of Care  Goal: Plan of Care Review  Outcome: Ongoing, Not Progressing  Goal: Patient-Specific Goal (Individualization)  Outcome: Ongoing, Not Progressing  Goal: Absence of Hospital-Acquired Illness or Injury  Outcome: Ongoing, Not Progressing  Goal: Optimal Comfort and Wellbeing  Outcome: Ongoing, Not Progressing  Goal: Readiness for Transition of Care  Outcome: Ongoing, Not Progressing  Goal: Rounds/Family Conference  Outcome: Ongoing, Not Progressing     Problem: Skin Injury Risk Increased  Goal: Skin Health and Integrity  Outcome: Ongoing, Not Progressing     Problem: Infection  Goal: Infection Symptom Resolution  Outcome: Ongoing, Not Progressing     Problem: Pain Acute  Goal: Optimal Pain Control  Outcome: Ongoing, Not Progressing

## 2020-04-18 NOTE — PLAN OF CARE
04/18/20 1607   Discharge Assessment   Assessment Type Discharge Planning Assessment   TN attempted to reach patient on room phone and cell without success.  TN called family member, Odilia at [phone], no answer, detailed message left requesting call back.

## 2020-04-18 NOTE — BRIEF OP NOTE
[facility]  Brief Operative Note    Surgery Date: 4/17/2020     Surgeon(s) and Role:     * Janet Amaro MD - Primary    Assisting Surgeon: None    Pre-op Diagnosis:  Closed displaced intertrochanteric fracture of right femur, initial encounter [S72.141A]    Post-op Diagnosis:  Post-Op Diagnosis Codes:     * Closed displaced intertrochanteric fracture of right femur, initial encounter [S72.141A]    Procedure(s) (LRB):  ORIF, HIP, USING DYNAMIC HIP SCREW (Right)    Anesthesia: General    Description of the findings of the procedure(s): intertrochanteric fracture     Estimated Blood Loss: 300 mL         Specimens:   Specimen (12h ago, onward)    None

## 2020-04-18 NOTE — SUBJECTIVE & OBJECTIVE
Interval History: Pain in hip is controlled. Complains of pressure like pain on the L side of his chest with no radiation that is worse with deep breathing and coughing. No shortness of breath, diaphoresis, palpitations.     Review of Systems   Constitutional: Negative for chills and fever.   Respiratory: Positive for chest tightness. Negative for cough and shortness of breath.    Cardiovascular: Positive for chest pain. Negative for leg swelling.   Gastrointestinal: Positive for constipation. Negative for abdominal pain, diarrhea, nausea and vomiting.     Objective:     Vital Signs (Most Recent):  Temp: 99.1 °F (37.3 °C) (04/18/20 0717)  Pulse: (!) 59 (04/18/20 0717)  Resp: 17 (04/18/20 0717)  BP: 113/74 (04/18/20 0717)  SpO2: 96 % (04/18/20 0717) Vital Signs (24h Range):  Temp:  [97.6 °F (36.4 °C)-99.1 °F (37.3 °C)] 99.1 °F (37.3 °C)  Pulse:  [59-72] 59  Resp:  [16-18] 17  SpO2:  [90 %-100 %] 96 %  BP: (109-135)/(59-74) 113/74     Weight: 72.1 kg (159 lb)  Body mass index is 21.56 kg/m².    Intake/Output Summary (Last 24 hours) at 4/18/2020 0940  Last data filed at 4/18/2020 0000  Gross per 24 hour   Intake --   Output 1760 ml   Net -1760 ml      Physical Exam   Constitutional: No distress.   Thin elderly man   HENT:   Head: Normocephalic and atraumatic.   Mouth/Throat: Oropharynx is clear and moist.   Cardiovascular: Normal rate, regular rhythm, normal heart sounds and intact distal pulses. Exam reveals no gallop and no friction rub.   No murmur heard.  No pain with palpation of chest wall   Pulmonary/Chest: Effort normal and breath sounds normal. No stridor. No respiratory distress. He has no wheezes. He has no rales.   Room air   Abdominal: Soft. Bowel sounds are normal. He exhibits no distension and no mass. There is no tenderness. There is no guarding.   Musculoskeletal: He exhibits edema and tenderness (L lateral thigh dressing in place, tender around dressing with mild swelling).   Neurological: He is  alert.   Skin: Skin is warm and dry. He is not diaphoretic.   Nursing note and vitals reviewed.      Significant Labs: All pertinent labs within the past 24 hours have been reviewed.    Significant Imaging: I have reviewed and interpreted all pertinent imaging results/findings within the past 24 hours.

## 2020-04-18 NOTE — PROGRESS NOTES
S: Pain in hip well controlled. Complaining of chest wall pain that he states has been present since his fall. No SOB, diaphoresis   Has not gotten up since fixation of hip - did not finish until around 7 pm last night    O:    Temp:  [97.6 °F (36.4 °C)-99.1 °F (37.3 °C)] 99.1 °F (37.3 °C)  Pulse:  [59-72] 59  Resp:  [16-18] 17  SpO2:  [90 %-100 %] 96 %  BP: (109-135)/(59-74) 113/74    PE:  NAD  right lower extremity:  Aquacell dressing in place without visible drainage. Not taken down.   SCDs in place  Rogers in place   Ltsi s/s/sp/dp/t  + ehl/fhl/ta/gs  2+ DP       Laboratory:  Most Recent Data:  CBC:   Lab Results   Component Value Date    WBC 6.14 04/18/2020    HGB 8.6 (L) 04/18/2020    HCT 29.9 (L) 04/18/2020    PLT SEE COMMENT 04/18/2020    MCV 88 04/18/2020    RDW 16.9 (H) 04/18/2020     BMP:   Lab Results   Component Value Date     04/18/2020    K 5.8 (H) 04/18/2020     04/18/2020    CO2 22 (L) 04/18/2020    BUN 64 (H) 04/18/2020    CREATININE 1.6 (H) 04/18/2020     (H) 04/18/2020    CALCIUM 8.9 04/18/2020       A/P: 79 y.o.male s/p ORIF of R intertroch with SHS on 4/17/20  - Abx: ancef x 24 h   - DVT ppx: SCDs, ambulation.  Chemoprophylaxis per primary with history of afib and ? Anticoagulation prior to admit -  From ortho standpoint recommend lovenox while in house with  mg BID on discharge  - PT OT - WBAT RLE with walker  - pain control - noroco 5/10 for mod and severe pain  - H/H trended down post op, will monitor. Stable this AM compared to PACU labs. Vitals stable, asymptomatic

## 2020-04-18 NOTE — NURSING
Report received from ANGÉLICA Chávez. Visualized patient and assessed patient's overall condition and appearance. No acute distress noted. Will continue to monitor

## 2020-04-18 NOTE — ASSESSMENT & PLAN NOTE
No active or prior treatment per daughter  Follows at [facility]  On hospice for this for the past year

## 2020-04-18 NOTE — PT/OT/SLP PROGRESS
Occupational Therapy      Patient Name:  Richard Rome   MRN:  [id number]    Patient not seen today secondary to (OT eval held per Dr. Elaine 2* pt c/o chest pain). Will follow-up tomorrow.    Teresa Arzate OT  4/18/2020

## 2020-04-18 NOTE — PLAN OF CARE
Problem: Fall Injury Risk  Goal: Absence of Fall and Fall-Related Injury  Outcome: Ongoing, Progressing     Problem: Adult Inpatient Plan of Care  Goal: Plan of Care Review  Outcome: Ongoing, Progressing  Goal: Patient-Specific Goal (Individualization)  Outcome: Ongoing, Progressing  Goal: Absence of Hospital-Acquired Illness or Injury  Outcome: Ongoing, Progressing  Goal: Optimal Comfort and Wellbeing  Outcome: Ongoing, Progressing  Goal: Readiness for Transition of Care  Outcome: Ongoing, Progressing  Goal: Rounds/Family Conference  Outcome: Ongoing, Progressing     Problem: Skin Injury Risk Increased  Goal: Skin Health and Integrity  Outcome: Ongoing, Progressing     Problem: Infection  Goal: Infection Symptom Resolution  Outcome: Ongoing, Progressing     Problem: Pain Acute  Goal: Optimal Pain Control  Outcome: Ongoing, Progressing

## 2020-04-18 NOTE — ASSESSMENT & PLAN NOTE
Trop 0.173 on admit. EKG no acute ST-T changes  TTE pending   He has a history of MI in the past and CAD with ischemic cardiomyopathy/CHF (per daughter)  Patient denies ACS symptoms prior to fall. Unclear if this is NSTEMI or demand ischemia  Cardiology consulted   Do not start hep gtt/asa/plavix per Cardiology  Not a good candidate for invasive procedure  Complaining of chest pain on 4/18-- check troponin, EKG, PRN nitro

## 2020-04-18 NOTE — ASSESSMENT & PLAN NOTE
Fall at home causing fracture. With history of multiple malignancies- unknown if pathologic. CT pending.   Ortho consulted  Given multiple strokes, heart attacks with now elevated troponin, CHF with unknown EF, renal insufficiency, Afib, Cardiology consulted for pre-op assessment-- high risk for surgery  Pain control- scheduled tylenol, PRN oxycodone, PRN dilaudid  Bowel regimen  PT, OT   On home hospice-- will need to discuss dispo after PT, OT consults

## 2020-04-19 PROBLEM — E87.5 HYPERKALEMIA: Status: RESOLVED | Noted: 2020-04-18 | Resolved: 2020-04-19

## 2020-04-19 PROBLEM — I48.11 LONGSTANDING PERSISTENT ATRIAL FIBRILLATION: Status: ACTIVE | Noted: 2020-04-17

## 2020-04-19 PROBLEM — R33.9 URINARY RETENTION: Status: ACTIVE | Noted: 2020-04-19

## 2020-04-19 LAB
ANION GAP SERPL CALC-SCNC: 8 MMOL/L (ref 8–16)
BASOPHILS # BLD AUTO: 0 K/UL (ref 0–0.2)
BASOPHILS NFR BLD: 0 % (ref 0–1.9)
BUN SERPL-MCNC: 61 MG/DL (ref 8–23)
CALCIUM SERPL-MCNC: 8.7 MG/DL (ref 8.7–10.5)
CHLORIDE SERPL-SCNC: 106 MMOL/L (ref 95–110)
CO2 SERPL-SCNC: 24 MMOL/L (ref 23–29)
CREAT SERPL-MCNC: 1.5 MG/DL (ref 0.5–1.4)
DIFFERENTIAL METHOD: ABNORMAL
EOSINOPHIL # BLD AUTO: 0 K/UL (ref 0–0.5)
EOSINOPHIL NFR BLD: 0.2 % (ref 0–8)
ERYTHROCYTE [DISTWIDTH] IN BLOOD BY AUTOMATED COUNT: 16.3 % (ref 11.5–14.5)
EST. GFR  (AFRICAN AMERICAN): 50 ML/MIN/1.73 M^2
EST. GFR  (NON AFRICAN AMERICAN): 44 ML/MIN/1.73 M^2
GLUCOSE SERPL-MCNC: 100 MG/DL (ref 70–110)
HCT VFR BLD AUTO: 26.5 % (ref 40–54)
HGB BLD-MCNC: 8 G/DL (ref 14–18)
IMM GRANULOCYTES # BLD AUTO: 0.02 K/UL (ref 0–0.04)
IMM GRANULOCYTES NFR BLD AUTO: 0.3 % (ref 0–0.5)
LYMPHOCYTES # BLD AUTO: 1.6 K/UL (ref 1–4.8)
LYMPHOCYTES NFR BLD: 24.4 % (ref 18–48)
MCH RBC QN AUTO: 24.8 PG (ref 27–31)
MCHC RBC AUTO-ENTMCNC: 30.2 G/DL (ref 32–36)
MCV RBC AUTO: 82 FL (ref 82–98)
MONOCYTES # BLD AUTO: 0.7 K/UL (ref 0.3–1)
MONOCYTES NFR BLD: 10.2 % (ref 4–15)
NEUTROPHILS # BLD AUTO: 4.3 K/UL (ref 1.8–7.7)
NEUTROPHILS NFR BLD: 64.9 % (ref 38–73)
NRBC BLD-RTO: 0 /100 WBC
PLATELET # BLD AUTO: 219 K/UL (ref 150–350)
PMV BLD AUTO: 10.5 FL (ref 9.2–12.9)
POTASSIUM SERPL-SCNC: 4.7 MMOL/L (ref 3.5–5.1)
RBC # BLD AUTO: 3.22 M/UL (ref 4.6–6.2)
SODIUM SERPL-SCNC: 138 MMOL/L (ref 136–145)
TROPONIN I SERPL DL<=0.01 NG/ML-MCNC: 0.17 NG/ML (ref 0–0.03)
WBC # BLD AUTO: 6.57 K/UL (ref 3.9–12.7)

## 2020-04-19 PROCEDURE — 25000003 PHARM REV CODE 250: Performed by: HOSPITALIST

## 2020-04-19 PROCEDURE — 80048 BASIC METABOLIC PNL TOTAL CA: CPT

## 2020-04-19 PROCEDURE — 63600175 PHARM REV CODE 636 W HCPCS: Performed by: ORTHOPAEDIC SURGERY

## 2020-04-19 PROCEDURE — 97165 OT EVAL LOW COMPLEX 30 MIN: CPT

## 2020-04-19 PROCEDURE — 36415 COLL VENOUS BLD VENIPUNCTURE: CPT

## 2020-04-19 PROCEDURE — 92610 EVALUATE SWALLOWING FUNCTION: CPT

## 2020-04-19 PROCEDURE — 97535 SELF CARE MNGMENT TRAINING: CPT

## 2020-04-19 PROCEDURE — 21400001 HC TELEMETRY ROOM

## 2020-04-19 PROCEDURE — 97161 PT EVAL LOW COMPLEX 20 MIN: CPT

## 2020-04-19 PROCEDURE — 85025 COMPLETE CBC W/AUTO DIFF WBC: CPT

## 2020-04-19 PROCEDURE — 25000003 PHARM REV CODE 250: Performed by: ORTHOPAEDIC SURGERY

## 2020-04-19 RX ORDER — POLYETHYLENE GLYCOL 3350 17 G/17G
17 POWDER, FOR SOLUTION ORAL 2 TIMES DAILY
Status: DISCONTINUED | OUTPATIENT
Start: 2020-04-19 | End: 2020-04-21 | Stop reason: HOSPADM

## 2020-04-19 RX ORDER — AMOXICILLIN 250 MG
2 CAPSULE ORAL 2 TIMES DAILY
Status: DISCONTINUED | OUTPATIENT
Start: 2020-04-19 | End: 2020-04-21 | Stop reason: HOSPADM

## 2020-04-19 RX ORDER — OMEPRAZOLE 40 MG/1
40 CAPSULE, DELAYED RELEASE ORAL DAILY
COMMUNITY

## 2020-04-19 RX ORDER — HYDROCODONE BITARTRATE AND ACETAMINOPHEN 7.5; 325 MG/1; MG/1
1 TABLET ORAL EVERY 6 HOURS PRN
Status: ON HOLD | COMMUNITY
End: 2020-04-21 | Stop reason: SDUPTHER

## 2020-04-19 RX ORDER — CITALOPRAM 20 MG/1
20 TABLET, FILM COATED ORAL DAILY
Status: DISCONTINUED | OUTPATIENT
Start: 2020-04-19 | End: 2020-04-21 | Stop reason: HOSPADM

## 2020-04-19 RX ORDER — OXYCODONE HYDROCHLORIDE 15 MG/1
15 TABLET ORAL EVERY 4 HOURS PRN
Status: ON HOLD | COMMUNITY
End: 2020-04-21 | Stop reason: HOSPADM

## 2020-04-19 RX ORDER — BISACODYL 10 MG
10 SUPPOSITORY, RECTAL RECTAL DAILY
COMMUNITY

## 2020-04-19 RX ORDER — BISACODYL 5 MG
10 TABLET, DELAYED RELEASE (ENTERIC COATED) ORAL DAILY
Status: DISCONTINUED | OUTPATIENT
Start: 2020-04-19 | End: 2020-04-21 | Stop reason: HOSPADM

## 2020-04-19 RX ORDER — TEMAZEPAM 15 MG/1
15 CAPSULE ORAL NIGHTLY PRN
Status: ON HOLD | COMMUNITY
End: 2020-04-21 | Stop reason: HOSPADM

## 2020-04-19 RX ORDER — TALC
6 POWDER (GRAM) TOPICAL NIGHTLY PRN
Status: DISCONTINUED | OUTPATIENT
Start: 2020-04-19 | End: 2020-04-21 | Stop reason: HOSPADM

## 2020-04-19 RX ORDER — MORPHINE SULFATE 30 MG/1
30 TABLET, FILM COATED, EXTENDED RELEASE ORAL 2 TIMES DAILY
Status: ON HOLD | COMMUNITY
End: 2020-04-21 | Stop reason: HOSPADM

## 2020-04-19 RX ORDER — LORAZEPAM 0.5 MG/1
0.25 TABLET ORAL EVERY 4 HOURS PRN
Status: ON HOLD | COMMUNITY
End: 2020-04-21 | Stop reason: HOSPADM

## 2020-04-19 RX ORDER — DORZOLAMIDE HCL 20 MG/ML
1 SOLUTION/ DROPS OPHTHALMIC DAILY
Status: DISCONTINUED | OUTPATIENT
Start: 2020-04-19 | End: 2020-04-21

## 2020-04-19 RX ORDER — DORZOLAMIDE HYDROCHLORIDE AND TIMOLOL MALEATE 20; 5 MG/ML; MG/ML
1 SOLUTION/ DROPS OPHTHALMIC 2 TIMES DAILY
Status: DISCONTINUED | OUTPATIENT
Start: 2020-04-19 | End: 2020-04-19

## 2020-04-19 RX ORDER — BUMETANIDE 1 MG/1
1 TABLET ORAL DAILY
Status: ON HOLD | COMMUNITY
End: 2020-04-21 | Stop reason: HOSPADM

## 2020-04-19 RX ORDER — CITALOPRAM 20 MG/1
20 TABLET, FILM COATED ORAL DAILY
COMMUNITY

## 2020-04-19 RX ORDER — DORZOLAMIDE HCL 20 MG/ML
1 SOLUTION/ DROPS OPHTHALMIC DAILY
COMMUNITY

## 2020-04-19 RX ORDER — LORAZEPAM 0.5 MG/1
0.5 TABLET ORAL EVERY 6 HOURS PRN
Status: DISCONTINUED | OUTPATIENT
Start: 2020-04-19 | End: 2020-04-21 | Stop reason: HOSPADM

## 2020-04-19 RX ORDER — AMOXICILLIN 250 MG
1 CAPSULE ORAL DAILY PRN
COMMUNITY

## 2020-04-19 RX ORDER — TAMSULOSIN HYDROCHLORIDE 0.4 MG/1
0.4 CAPSULE ORAL DAILY
Status: DISCONTINUED | OUTPATIENT
Start: 2020-04-19 | End: 2020-04-20

## 2020-04-19 RX ORDER — PANTOPRAZOLE SODIUM 40 MG/1
40 TABLET, DELAYED RELEASE ORAL DAILY
Status: DISCONTINUED | OUTPATIENT
Start: 2020-04-19 | End: 2020-04-21 | Stop reason: HOSPADM

## 2020-04-19 RX ADMIN — GUAIFENESIN AND DEXTROMETHORPHAN HYDROBROMIDE 1 TABLET: 600; 30 TABLET, EXTENDED RELEASE ORAL at 08:04

## 2020-04-19 RX ADMIN — HEPARIN SODIUM 5000 UNITS: 5000 INJECTION INTRAVENOUS; SUBCUTANEOUS at 06:04

## 2020-04-19 RX ADMIN — OXYCODONE 5 MG: 5 TABLET ORAL at 02:04

## 2020-04-19 RX ADMIN — DORZOLAMIDE HYDROCHLORIDE AND TIMOLOL MALEATE 1 DROP: 20; 5 SOLUTION/ DROPS OPHTHALMIC at 02:04

## 2020-04-19 RX ADMIN — DOCUSATE SODIUM AND SENNOSIDES 1 TABLET: 50; 8.6 TABLET, FILM COATED ORAL at 08:04

## 2020-04-19 RX ADMIN — BRIMONIDINE TARTRATE 1 DROP: 2 SOLUTION OPHTHALMIC at 06:04

## 2020-04-19 RX ADMIN — DOCUSATE SODIUM AND SENNOSIDES 2 TABLET: 50; 8.6 TABLET, FILM COATED ORAL at 09:04

## 2020-04-19 RX ADMIN — GUAIFENESIN AND DEXTROMETHORPHAN HYDROBROMIDE 1 TABLET: 600; 30 TABLET, EXTENDED RELEASE ORAL at 09:04

## 2020-04-19 RX ADMIN — DORZOLAMIDE HYDROCHLORIDE 1 DROP: 20 SOLUTION/ DROPS OPHTHALMIC at 02:04

## 2020-04-19 RX ADMIN — OXYCODONE 5 MG: 5 TABLET ORAL at 11:04

## 2020-04-19 RX ADMIN — BRIMONIDINE TARTRATE 1 DROP: 2 SOLUTION OPHTHALMIC at 02:04

## 2020-04-19 RX ADMIN — ACETAMINOPHEN 1000 MG: 500 TABLET ORAL at 06:04

## 2020-04-19 RX ADMIN — ACETAMINOPHEN 1000 MG: 500 TABLET ORAL at 02:04

## 2020-04-19 RX ADMIN — BISACODYL 10 MG: 5 TABLET, COATED ORAL at 11:04

## 2020-04-19 RX ADMIN — CITALOPRAM HYDROBROMIDE 20 MG: 20 TABLET ORAL at 11:04

## 2020-04-19 RX ADMIN — HEPARIN SODIUM 5000 UNITS: 5000 INJECTION INTRAVENOUS; SUBCUTANEOUS at 02:04

## 2020-04-19 RX ADMIN — HEPARIN SODIUM 5000 UNITS: 5000 INJECTION INTRAVENOUS; SUBCUTANEOUS at 09:04

## 2020-04-19 RX ADMIN — TAMSULOSIN HYDROCHLORIDE 0.4 MG: 0.4 CAPSULE ORAL at 08:04

## 2020-04-19 RX ADMIN — POLYETHYLENE GLYCOL 3350 17 G: 17 POWDER, FOR SOLUTION ORAL at 08:04

## 2020-04-19 RX ADMIN — PANTOPRAZOLE SODIUM 40 MG: 40 TABLET, DELAYED RELEASE ORAL at 11:04

## 2020-04-19 RX ADMIN — DORZOLAMIDE HYDROCHLORIDE AND TIMOLOL MALEATE 1 DROP: 20; 5 SOLUTION/ DROPS OPHTHALMIC at 06:04

## 2020-04-19 RX ADMIN — ACETAMINOPHEN 1000 MG: 500 TABLET ORAL at 09:04

## 2020-04-19 NOTE — SUBJECTIVE & OBJECTIVE
Interval History: Asks to be adjusted in bed. No pain in hip. Pain in chest is only with coughing and improving compared to yesterday. Still having urinary retention and is constipated.     Review of Systems   Constitutional: Negative for chills and fever.   Respiratory: Negative for cough, chest tightness and shortness of breath.    Cardiovascular: Negative for chest pain and leg swelling.   Gastrointestinal: Positive for constipation. Negative for abdominal pain, diarrhea, nausea and vomiting.   Genitourinary: Positive for difficulty urinating.     Objective:     Vital Signs (Most Recent):  Temp: 98 °F (36.7 °C) (04/19/20 0827)  Pulse: 70 (04/19/20 0827)  Resp: 17 (04/19/20 0827)  BP: 103/61 (04/19/20 0827)  SpO2: 97 % (04/19/20 0827) Vital Signs (24h Range):  Temp:  [98 °F (36.7 °C)-98.6 °F (37 °C)] 98 °F (36.7 °C)  Pulse:  [55-77] 70  Resp:  [17-18] 17  SpO2:  [92 %-100 %] 97 %  BP: ()/(59-68) 103/61     Weight: 72.1 kg (159 lb)  Body mass index is 21.56 kg/m².    Intake/Output Summary (Last 24 hours) at 4/19/2020 0952  Last data filed at 4/19/2020 0600  Gross per 24 hour   Intake --   Output 1200 ml   Net -1200 ml      Physical Exam   Constitutional: No distress.   Thin elderly man   HENT:   Head: Normocephalic and atraumatic.   Mouth/Throat: Oropharynx is clear and moist.   Cardiovascular: Normal rate, regular rhythm, normal heart sounds and intact distal pulses. Exam reveals no gallop and no friction rub.   No murmur heard.  No pain with palpation of chest wall   Pulmonary/Chest: Effort normal and breath sounds normal. No stridor. No respiratory distress. He has no wheezes. He has no rales.   Room air   Abdominal: Soft. Bowel sounds are normal. He exhibits no distension and no mass. There is no tenderness. There is no guarding.   Musculoskeletal: He exhibits no edema or tenderness (R lateral thigh dressing in place).   Neurological: He is alert.   Skin: Skin is warm and dry. He is not diaphoretic.    Nursing note and vitals reviewed.      Significant Labs: All pertinent labs within the past 24 hours have been reviewed.    Significant Imaging: I have reviewed and interpreted all pertinent imaging results/findings within the past 24 hours.

## 2020-04-19 NOTE — ASSESSMENT & PLAN NOTE
Not active per daughter  Per last Onc note (in [facility] system, unable to access [facility] records)-- TCC TA, G1 22 and has been treated with TUR resection

## 2020-04-19 NOTE — PT/OT/SLP EVAL
Physical Therapy Evaluation    Patient Name:  Richard Rome   MRN:  [id number]    Recommendations:     Discharge Recommendations:  nursing facility, skilled   Discharge Equipment Recommendations: other (see comments)(TBD)   Barriers to discharge: Decreased caregiver support    Assessment:     Richard Rome is a 79 y.o. male admitted with a medical diagnosis of Intertrochanteric fracture of right femur.  He presents with the following impairments/functional limitations:  weakness, impaired endurance, impaired self care skills, gait instability, impaired functional mobilty, impaired balance, impaired cognition, decreased coordination, decreased lower extremity function, decreased safety awareness, pain, impaired coordination, decreased ROM, impaired cardiopulmonary response to activity, impaired joint extensibility, impaired muscle length, orthopedic precautions . Due to impairments, pt is at increased risk for falls and is unable to perform transfers or ADLs at [facility].    Rehab Prognosis: Good; patient would benefit from acute skilled PT services to address these deficits and reach maximum level of function.    Recent Surgery: Procedure(s) (LRB):  ORIF, HIP, USING DYNAMIC HIP SCREW (Right) 2 Days Post-Op    Plan:     During this hospitalization, patient to be seen daily to address the identified rehab impairments via gait training, therapeutic exercises, therapeutic activities, neuromuscular re-education and progress toward the following goals:    · Plan of Care Expires:  05/03/20    Subjective     Chief Complaint: R hip discomfort  Patient/Family Comments/goals: pt would like to return to his appartment  Pain/Comfort:  · Pain Rating 1: 0/10(minimal pain, pt unable to rate at this time)  · Location - Side 1: Right  · Location 1: hip  · Pain Addressed 1: Pre-medicate for activity, Nurse notified, Cessation of Activity  · Pain Rating Post-Intervention 1: 0/10    Patients cultural, spiritual, Catholic conflicts given the  "current situation: no    Living Environment:  The patient lives alone on first floor of [facility].  The patient reports using the bus or cab to get groceries.  Prior to admission, patients level of function was pt amb w/ rollator (endorses 4 falls within the past 6 months). Pt stands to bathe in shower and dresses himself independently Pt has a nurse 1x/week and a CNA 2x/week. The patient receives assist to cook or perform self care as needed. The patient reports "some days I don't get out of bed".   Equipment used at home: rollator, bedside commode.  DME owned (not currently used): none.  Upon discharge, patient will have assistance from daughters and CNA.    Objective:     Communicated with Christina beckman, prior to session.  Patient found supine with bed alarm, telemetry, SCD  upon PT entry to room.    General Precautions: Standard, hearing impaired, fall   Orthopedic Precautions:RLE weight bearing as tolerated   Braces: N/A     Exams:  · Cognitive Exam:  Patient is oriented to Person, Time and .  · Postural Exam:  Patient presented with the following abnormalities:    · -       Rounded shoulders  · -       Forward head  · RLE ROM: Deficits: in AROM/PROM of R hip as expected POD #2  · RLE Strength: Deficits: grossly limited throughout R LE as expected POD#2. Pt able to move toes, 4/5 strength at ankle, 3+/5 at knee  · LLE ROM: WFL  · LLE Strength: WFL    Functional Mobility:  · Bed Mobility:     · Rolling Left:  moderate assistance  · Rolling Right: moderate assistance  · Scooting: moderate assistance  · Bridging: moderate assistance  · Supine to Sit: moderate assistance  · Transfers:     · Sit to Stand:  moderate assistance with rolling walker  · Gait: Pt performs 3 side steps at EOB w/ RW and min Ax1. Pt tolerates WB through R LE demonstrating no buckling of the knee  · Balance: Good in sitting, poor in standing      Therapeutic Activities and Exercises:   Pt educated to perform ankle pumps every hour " that he is laying in bed    AM-PAC 6 CLICK MOBILITY  Total Score:11     Patient left HOB elevated with all lines intact, call button in reach, bed alarm on and nsg notified.    GOALS:   Multidisciplinary Problems     Physical Therapy Goals        Problem: Physical Therapy Goal    Goal Priority Disciplines Outcome Goal Variances Interventions   Physical Therapy Goal     PT, PT/OT Ongoing, Progressing     Description:  Goals to be met by: 5/3/20     Patient will increase functional independence with mobility by performin. Supine to sit with Stand-by Assistance  2. Rolling to Left and Right with Stand-by Assistance.  3. Sit to stand transfer with Stand-by Assistance  4. Gait  x 150 feet with Contact Guard Assistance using Rolling Walker.   5. Sitting at edge of bed x20 minutes with Modified Crane  6. Lower extremity exercise program x15 reps per handout, with independence                      History:     Past Medical History:   Diagnosis Date    Arthritis     Bladder cancer 2017    s/p TURBT    Cataract     CHF (congestive heart failure)     Depression     Hypertension     Meningitis     Prostate cancer     with bone metastases; on hospice for this    Prostate cancer metastatic to bone 2020    Renal cell carcinoma     s/p nephrectomy    Renal insufficiency 2020    Stroke        Past Surgical History:   Procedure Laterality Date    KNEE SURGERY  2014    NEPHRECTOMY         Time Tracking:     PT Received On: 20  PT Start Time: 1155     PT Stop Time: 1221  PT Total Time (min): 26 min     Billable Minutes: Evaluation 10    (Co-treat w/ OT)    Nathan Moran, PT  2020

## 2020-04-19 NOTE — PT/OT/SLP EVAL
Occupational Therapy   Evaluation/Treatment    Name: Richard Rome  MRN: [id number]  Admitting Diagnosis:  Intertrochanteric fracture of right femur 2 Days Post-Op    Recommendations:     Discharge Recommendations: nursing facility, skilled  Discharge Equipment Recommendations:  (TBD)  Barriers to discharge:  Decreased caregiver support(Patient lives alone)    Assessment:     Richard Rome is a 79 y.o. male with a medical diagnosis of Intertrochanteric fracture of right femur.   Performance deficits affecting function: weakness, impaired endurance, impaired self care skills, impaired functional mobilty, gait instability, impaired balance, decreased upper extremity function, decreased lower extremity function, decreased coordination, decreased safety awareness, orthopedic precautions, edema, impaired skin.    The patient participated in OT eval. The patient required mod assist for bed mobility and to stand using a RW. Patient will benefit from OT to address functional deficits.   The patient will benefit from SNF.    Rehab Prognosis: Good; patient would benefit from acute skilled OT services to address these deficits and reach maximum level of function.       Plan:     Patient to be seen 5 x/week, 6 x/week to address the above listed problems via self-care/home management, therapeutic activities, therapeutic exercises  · Plan of Care Expires: 05/03/20  · Plan of Care Reviewed with: patient    Subjective     Chief Complaint: not sure why his hip hurts  Patient/Family Comments/goals: wants to return to his apartment    Occupational Profile:  Living Environment: The patient lives alone at [facility].  Previous level of function: Mod I amb using a rollator. The patient stands in the shower to bathe and dresses himself.  Roles and Routines: The patient reports using the bus or cab to get groceries.The patient has a nurse 1x/week and a CNA 2x/week. The patient receives assist to cook or perform self care as needed. The  "patient reports "some days I don't get out of bed".  Equipment Used at Home:  bedside commode, rollator  Assistance upon Discharge: has 4 children    Pain/Comfort:  · Pain Rating 1: (minimal c/o pain (unable to rate))  · Location - Side 1: Right  · Location 1: hip  · Pain Addressed 1: Pre-medicate for activity, Cessation of Activity(pain meds were given at the start of OT)    Patients cultural, spiritual, Uatsdin conflicts given the current situation:      Objective:     Communicated with: nurseChristina prior to session.  Patient found HOB elevated, pillow under right hip with bed alarm, telemetry, SCD upon OT entry to room.    General Precautions: Standard, hearing impaired, fall   Orthopedic Precautions:RLE weight bearing as tolerated   Braces: N/A     Occupational Performance:    Bed Mobility:    · Patient completed Scooting/Bridging with moderate assistance  · Patient completed Supine to Sit with moderate assistance, with side rail and with leg lift  · Patient completed Sit to Supine with moderate assistance and with leg lift    Functional Mobility/Transfers:  · Patient completed Sit <> Stand Transfer with moderate assistance  with  rolling walker   · Functional Mobility: The patient side stepped along the EOB 4-5 steps with mod assist.    Activities of Daily Living:  · Grooming: modified independence and set up to wipe hands and face in bed  · Upper Body Dressing: moderate assistance    · Lower Body Dressing: dependence      Cognitive/Visual Perceptual:  Cognitive/Psychosocial Skills:     -       Oriented to: Person, Place and month   -       Follows Commands/attention:Follows two-step commands with difficulty 2* [place]  -       Communication: clear/fluent  -       Memory: No Deficits noted  -       Safety awareness/insight to disability: impaired   -       Mood/Affect/Coping skills/emotional control: Appropriate to situation    Physical Exam:  Balance: -       fair+ sit  Postural examination/scapula alignment: " "   -       Rounded shoulders  -       Forward head  Skin integrity: right hip incision covered by dressing  Dominant hand: -       right  Upper Extremity Range of Motion:     -       Right Upper Extremity: WFL  -       Left Upper Extremity: WFL  Upper Extremity Strength:    -       Right Upper Extremity: WFL  -       Left Upper Extremity: WFL   Strength:    -       Right Upper Extremity: slightly weak 2* "arthritis"  -       Left Upper Extremity: WFL  The patient reports decreased sensation in his right hand.    AMPAC 6 Click ADL:  AMPAC Total Score: 15    Treatment & Education:  The patient participated in OT eval. Self-care tasks/functional mobility completed- assistance level noted above   · Pt educated on OT role/POC.   · White board updated          Education:    Patient left HOB elevated with all lines intact, call button in reach, bed alarm on and nurse notified    GOALS:   Multidisciplinary Problems     Occupational Therapy Goals        Problem: Occupational Therapy Goal    Goal Priority Disciplines Outcome Interventions   Occupational Therapy Goal     OT, PT/OT Ongoing, Progressing    Description:  Goals to be met by: 5/3/20    Patient will increase functional independence with ADLs by performing:    UE Dressing with Stand-by Assistance.  LE Dressing with Contact Guard Assistance.  Grooming while standing at sink with Contact Guard Assistance.  Supine to sit with Stand-by Assistance.  Step transfer with Contact Guard Assistance  Toilet transfer to bedside commode with Contact Guard Assistance.  Upper extremity exercise program x10 reps per handout, with assistance as needed.                      History:     Past Medical History:   Diagnosis Date    Arthritis     Bladder cancer 12/22/2017    s/p TURBT    Cataract     CHF (congestive heart failure)     Depression     Hypertension     Meningitis     Prostate cancer     with bone metastases; on hospice for this    Prostate cancer metastatic to " bone 4/17/2020    Renal cell carcinoma     s/p nephrectomy    Renal insufficiency 4/17/2020    Stroke 2014       Past Surgical History:   Procedure Laterality Date    KNEE SURGERY  2014    NEPHRECTOMY  2013       Time Tracking:     OT Date of Treatment: 04/19/20  OT Start Time: 1156  OT Stop Time: 1220  OT Total Time (min): 24 min    Billable Minutes:Evaluation 16 (with PT)  Self Care/Home Management 8  Total Time 24    Teresa Arzate OT  4/19/2020

## 2020-04-19 NOTE — NURSING
"Patient's daughter called me with concerns about her father's eyedrops and chest pain. She said her father knows his drops and "I will come after the hospital if my father dies because of your negligence." I assured her we are taking care of her father. She wanted me to speak with the patient while she listened on the phone. Mr Ramos says he has a pain in his left chest and wants his eyedrops because he is going blind. He is unable to describe his pain and said he should not have to tell me. He said he wants to be transferred to another facility.    I then spoke with his nurse Polina. She gave him Tylenol and cough medicine at 2103, and gave his brimonidine eye drop at that time also. He has a home medication order for dorzolamide-timolol 2-0.5% (COSOPT) 22.3-6.8 mg/mL ophthalmic solution   Dr Ornelas was notified about the daughter's call and the patient's complaints.    Orders for STAT EKG and serial troponins were noted on his chart.   "

## 2020-04-19 NOTE — PLAN OF CARE
CORTEZ contacted patient's daughter Odilia ([phone]) to complete discharge assessment. Odilia informed SW that patient lives by himself. Odilia stated that patient lives in [facility] which houses the elderly. Odilia stated that patient was being seen by [facility] 3 times a week. Odilia stated that patient does not have a POA. Odilia stated that she does take care of all of his business for him. Odilia stated that patient requested to be a full code and stated that she does not remember signing a [place]. Odilia would like patient to continue with [facility]. Odilia stated that patient has been falling a lot lately and is the reason for his admission into the hospital.      04/19/20 0918   Discharge Assessment   Assessment Type Discharge Planning Assessment   Confirmed/corrected address and phone number on facesheet? Yes   Assessment information obtained from? Caregiver  (Odilia - Daughter)   Communicated expected length of stay with patient/caregiver yes   Prior to hospitilization cognitive status: Alert/Oriented   Prior to hospitalization functional status: Independent;Assistive Equipment   Current cognitive status: Alert/Oriented   Current Functional Status: Independent;Assistive Equipment   Facility Arrived From: Home    Lives With alone   Able to Return to Prior Arrangements yes   Is patient able to care for self after discharge? Yes   Who are your caregiver(s) and their phone number(s)? Odilia [phone]   Patient's perception of discharge disposition skilled nursing facility;home or selfcare   Readmission Within the Last 30 Days no previous admission in last 30 days   Patient currently being followed by outpatient case management? No   Patient currently receives any other outside agency services? Yes   Name and contact number of agency or person providing outside services [facility]   Is it the patient/care giver preference to resume  care with the current outside agency? Yes   Equipment Currently Used at Home oxygen;bedside commode;cane, straight;walker, rolling   Do you have any problems affording any of your prescribed medications? No   Is the patient taking medications as prescribed? yes   Does the patient have transportation home? Yes   Transportation Anticipated family or friend will provide   Does the patient receive services at the [facility]? No   Discharge Plan A Skilled Nursing Facility   Discharge Plan B Home;Hospice/home   DME Needed Upon Discharge  none   Patient/Family in Agreement with Plan yes     Edvin Reyes MD      [id number] - [place] [address] AT [address] [address]  Phone: [phone] Fax: [phone]    Extended Emergency Contact Information  Primary Emergency Contact: Odilia Rome   [place]  Mobile Phone: [phone]  Relation: Other

## 2020-04-19 NOTE — PROGRESS NOTES
S: Pain in hip well controlled. He states that he has been able to move around in bed a little with minimal discomfort.  Not participate with PT or OT is today per primary team because of concern of new complaint of chest pain.  He continues to complain of chest wall pain that he does not believe it is associated with his heart.    O:  Temp:  [98 °F (36.7 °C)-98.6 °F (37 °C)] 98 °F (36.7 °C)  Pulse:  [55-77] 70  Resp:  [17-18] 17  SpO2:  [92 %-100 %] 97 %  BP: ()/(59-68) 103/61    PE:  NAD  right lower extremity:  Aquacell dressing in place without visible drainage. Not taken down.   SCDs in place  Rogers out  Ltsi s/s/sp/dp/t  + ehl/fhl/ta/gs  2+ DP     Laboratory:  Most Recent Data:  CBC:   Lab Results   Component Value Date    WBC 6.57 04/19/2020    HGB 8.0 (L) 04/19/2020    HCT 26.5 (L) 04/19/2020     04/19/2020    MCV 82 04/19/2020    RDW 16.3 (H) 04/19/2020     BMP:   Lab Results   Component Value Date     04/19/2020    K 4.7 04/19/2020     04/19/2020    CO2 24 04/19/2020    BUN 61 (H) 04/19/2020    CREATININE 1.5 (H) 04/19/2020     04/19/2020    CALCIUM 8.7 04/19/2020       A/P: 79 y.o.male s/p ORIF of R intertroch with SHS on 4/17/20  - Abx: ancef x 24 h postop - complete  - DVT ppx: SCDs, heparin.  Will need  mg BID x 4 weeks on discharge if okay with primary team.  Dr. Chávez note mentions that he is a poor candidate for anticoagulation because of falls - risk of thromoembolic event will need to be weighed against risk of bleeding  - PT OT - WBAT RLE with walker  - H/H trended down slightly. Vitals stable, asymptomatic. Will monitor   - Daughter Odilia updated this AM

## 2020-04-19 NOTE — NURSING
"Patient reported constant pain with cough. Patient has a productive cough with clear thick sputum. Patient also reports feeling short-winded. Patient O2 sat at room air 98%. O2 applied @ 0.5L/NC for comfort.  Patient stated to nurse he will contact daughter about missing eye drop. Daughter contacted charge nurse with legal allegations about patient condition. Jordan VILLAVICENCIO, notified of patient current health conditions and family concerns. MD states she do not handle non-emergent issues and patient will not be transferred tonight for family request. Nurse states its emergent due to patient report of chest pain and short-windedness. MD asked have patient taken Tylenol or something for cough. Nurse states yes patient just received Tylenol and Mucinex po as scheduled. MD stated "well okay thank you!".  "

## 2020-04-19 NOTE — ASSESSMENT & PLAN NOTE
Trop 0.173 on admit. EKG no acute ST-T changes  TTE pending   He has a history of MI in the past and CAD with ischemic cardiomyopathy/CHF (per daughter)  Patient denies ACS symptoms prior to fall. Unclear if this is NSTEMI or demand ischemia  Cardiology consulted   Do not start hep gtt/asa/plavix per Cardiology  Not a good candidate for invasive procedure  Complaining of chest pain on 4/18. EKG Afib no ischemia, troponin similar to prior-- unlikely ACS.

## 2020-04-19 NOTE — CARE UPDATE
Atypical CP noted  Troponin flat  Echo with good EF  Doubt ACS  Ok for out patient stress test - will f/u prn

## 2020-04-19 NOTE — ASSESSMENT & PLAN NOTE
History of multiple MI, last was a few years ago per daughter  Unknown home med list  With elevated troponin on admit

## 2020-04-19 NOTE — PROGRESS NOTES
"[facility] Medicine  Progress Note    Patient Name: Richard Rome  MRN: [id number]  Patient Class: IP- Inpatient   Admission Date: 4/17/2020  Length of Stay: 2 days  Attending Physician: Beth Elaine MD  Primary Care Provider: Edvin Reyes MD        Subjective:     Principal Problem:Intertrochanteric fracture of right femur        HPI:  Mr Richard Rome is a 79 y.o. man with prostate cancer, CHF, stroke who presents after fall. He lives at home alone in an apartment. He walks with a rollator at baseline but does have issues with balance. He fell on Friday (unclear if he means last Friday or this Friday). Then he fell again today when he was getting out of his chair into his walker. He denies hitting his head, but he said that everything "went black." He denies chest pain, shortness of breath, palpitations, presyncope prior to the fall. He denies biting his tongue and bowel/bladder incontinence. He currently has pain in his right groin that he cannot describe more than "it hurts." He denies pain elsewhere. He says he is also having difficulty urinating and has been trying to urinate for the last 45 minutes.     In ED, found to have acute mildly displaced intertrochanteric fracture of the proximal right femur. Also with Afib on arrival-- unknown if new vs old, elevated troponin without symptoms of ACS, and impaired renal function- unknown if new vs old. 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 He also says he has CHF-- unknown last EF but denies shortness of breath, orthopnea, PND, leg swelling, abdominal distension.     Called daughter Odilia Rome. Daughter says he fell a week ago, 2 days ago. She says he is on hospice and has a hospice nurse that checks on him. He is on hospice for prostate cancer for the last " year. He has not received any treatment for prostate cancer. He has multiple strokes with no residual deficits. He has had multiple heart attacks in the past- last was several years ago. Daughter confirms that he does have a history of Afib but does not know about any kidney dysfunction.     Overview/Hospital Course:  Admitted with R proximal femur intertrochanteric fracture. Also with Afib rate controlled, elevated troponin unknown if ACS vs demand ischemia, and impaired renal function that is likely chronic. Cardiology consulted. High risk for surgery. Ortho consulted. Taken to OR 4/17 for R hip ORIF using dynamic hip screw. Post op, complained of chest pain; EKG no ischemia but is in Afib, troponin similar to prior, unable to take nitrate due to intolerance, and did not improve with dilaudid- unlikely ACS. Pain associated with coughing and improving with mucinex.     Interval History: Asks to be adjusted in bed. No pain in hip. Pain in chest is only with coughing and improving compared to yesterday. Still having urinary retention and is constipated.     Review of Systems   Constitutional: Negative for chills and fever.   Respiratory: Negative for cough, chest tightness and shortness of breath.    Cardiovascular: Negative for chest pain and leg swelling.   Gastrointestinal: Positive for constipation. Negative for abdominal pain, diarrhea, nausea and vomiting.   Genitourinary: Positive for difficulty urinating.     Objective:     Vital Signs (Most Recent):  Temp: 98 °F (36.7 °C) (04/19/20 0827)  Pulse: 70 (04/19/20 0827)  Resp: 17 (04/19/20 0827)  BP: 103/61 (04/19/20 0827)  SpO2: 97 % (04/19/20 0827) Vital Signs (24h Range):  Temp:  [98 °F (36.7 °C)-98.6 °F (37 °C)] 98 °F (36.7 °C)  Pulse:  [55-77] 70  Resp:  [17-18] 17  SpO2:  [92 %-100 %] 97 %  BP: ()/(59-68) 103/61     Weight: 72.1 kg (159 lb)  Body mass index is 21.56 kg/m².    Intake/Output Summary (Last 24 hours) at 4/19/2020 0990  Last data filed at  4/19/2020 0600  Gross per 24 hour   Intake --   Output 1200 ml   Net -1200 ml      Physical Exam   Constitutional: No distress.   Thin elderly man   HENT:   Head: Normocephalic and atraumatic.   Mouth/Throat: Oropharynx is clear and moist.   Cardiovascular: Normal rate, regular rhythm, normal heart sounds and intact distal pulses. Exam reveals no gallop and no friction rub.   No murmur heard.  No pain with palpation of chest wall   Pulmonary/Chest: Effort normal and breath sounds normal. No stridor. No respiratory distress. He has no wheezes. He has no rales.   Room air   Abdominal: Soft. Bowel sounds are normal. He exhibits no distension and no mass. There is no tenderness. There is no guarding.   Musculoskeletal: He exhibits no edema or tenderness (R lateral thigh dressing in place).   Neurological: He is alert.   Skin: Skin is warm and dry. He is not diaphoretic.   Nursing note and vitals reviewed.      Significant Labs: All pertinent labs within the past 24 hours have been reviewed.    Significant Imaging: I have reviewed and interpreted all pertinent imaging results/findings within the past 24 hours.      Assessment/Plan:      * Intertrochanteric fracture of right femur  Fall at home causing fracture. With history of multiple malignancies- unknown if pathologic. CT pending.   Ortho consulted  Given multiple strokes, heart attacks with now elevated troponin, CHF with unknown EF, renal insufficiency, Afib, Cardiology consulted for pre-op assessment-- high risk for surgery  Pain control- scheduled tylenol, PRN oxycodone, PRN dilaudid  Bowel regimen- increase for constipation  PT, OT   On home hospice-- will need to discuss dispo after PT, OT consults      Pulmonary hypertension  As noted on TTE  Due to L heart disease (WHO group 2)      Coronary artery disease involving native coronary artery of native heart without angina pectoris  History of multiple MI, last was a few years ago per daughter  Unknown home med  list  With elevated troponin on admit      Prostate cancer metastatic to bone  No active or prior treatment per daughter  Follows at [facility]  On hospice for this for the past year  With urinary retention- resume flomax. In and out cath as needed        Longstanding persistent atrial fibrillation  Per daughter, this is not new  Unknown if he is on rate/rhythm control Rx or anticoagulation at home. Do not have home med list  Rate controlled currently without Rx  Poor candidate for anticoagulation given falls at home, but CHADS-VASc is high (CHF, HTN, age, stroke history)  Monitor HR      Chronic diastolic congestive heart failure  Unknown EF  Due to CAD  Mild lower extremity edema currently, but does not seem to have active CHF exacerbation  TTE with DD, normal EF  No BB due to bradycardia  No ACEi/ARB due to renal insufficiency and hyperkalemia      Fall  Causing hip fracture  PT, OT    History of renal cell carcinoma  S/p L nephro ureterectomy  Not currently active per daughter      Elevated troponin  Trop 0.173 on admit. EKG no acute ST-T changes  TTE pending   He has a history of MI in the past and CAD with ischemic cardiomyopathy/CHF (per daughter)  Patient denies ACS symptoms prior to fall. Unclear if this is NSTEMI or demand ischemia  Cardiology consulted   Do not start hep gtt/asa/plavix per Cardiology  Not a good candidate for invasive procedure  Complaining of chest pain on 4/18. EKG Afib no ischemia, troponin similar to prior-- unlikely ACS.       Renal insufficiency  Cr 1.8 on admit, unknown if this is acute or chronic. Does have solitary kidney after history of RCC s/p nephrectomy  IVF while NPO, but cautious due to CHF history  Place severino  Monitor-- suspect this is chronic  Avoid nephrotoxins, renal dose all meds      Anemia  No bleeding noted. Unknown if chronic but suspect it is. Monitor post op-- stable      History of bladder cancer  Not active per daughter  Per last Onc note (in [employer] system,  unable to access [facility] records)-- TCC TA, G1 22 and has been treated with TUR resection         VTE Risk Mitigation (From admission, onward)         Ordered     heparin (porcine) injection 5,000 Units  Every 8 hours      04/17/20 1629     IP VTE HIGH RISK PATIENT  Once      04/17/20 1012     Place sequential compression device  Until discontinued      04/17/20 1012              10:02 AM  Called to talk to daughter Odilia. Addressed all of her concerns. She says that she faxed over a list of all of his medications-- will look in paper chart. All questions answered.     11:09 AM  Found med list. Med rec corrected in Epic. Patient not having pain. Will hold MS Contin and continue PRN opioids for now. If he is having significant pain with therapy, will resume. Ordered PRN ativan which should help with his anxiety. Temazepam not on formulary.     Beth Elaine MD  Department of Hospital Medicine   [facility]

## 2020-04-19 NOTE — PT/OT/SLP EVAL
"Speech Language Pathology Evaluation  Bedside Swallow    Patient Name:  Richard Rome   MRN:  [id number]  Admitting Diagnosis: Intertrochanteric fracture of right femur    Recommendations:                 General Recommendations:  Dysphagia therapy  Diet recommendations:  Puree, Nectar Thick   Aspiration Precautions: 1 bite/sip at a time   General Precautions: Standard,    Communication strategies:  Pt [place], increase volume    History:     Past Medical History:   Diagnosis Date    Arthritis     Bladder cancer 12/22/2017    s/p TURBT    Cataract     CHF (congestive heart failure)     Depression     Hypertension     Meningitis     Prostate cancer     with bone metastases; on hospice for this    Prostate cancer metastatic to bone 4/17/2020    Renal cell carcinoma     s/p nephrectomy    Renal insufficiency 4/17/2020    Stroke 2014       Past Surgical History:   Procedure Laterality Date    KNEE SURGERY  2014    NEPHRECTOMY  2013     Social History: Patient lives alone    Prior Intubation HX:  Intubated 4/17 for surgery    Modified Barium Swallow: n/a    Chest X-Rays: 4/17 1.  Mild coarse accentuated interstitial lung markings without evidence of confluent airspace consolidation or pneumothorax.  2.  1.5 cm nodular opacity projecting over the right superior hilar region.  This may represent a prominent pulmonary vessel, although recommend nonemergent chest CT follow-up to exclude underlying pulmonary nodule.  Alternatively, correlation with any prior outside imaging for stability is advised.    Prior diet: soft    Subjective   Pt reporting "I know how to cook my food" regarding how to make foods soft enough that he can swallow them without issue as he is edentulous. He is requesting puree diet for duration of admit.    Patient goals: return to medical baseline     Pain/Comfort:  · Pain Rating 1: 0/10    Objective:     Oral Musculature Evaluation  · Oral Musculature: WFL  · Dentition: edentulous  · Secretion " Management: adequate  · Mucosal Quality: good  · Mandibular Strength and Mobility: WFL  · Oral Labial Strength and Mobility: WFL  · Lingual Strength and Mobility: WFL  · Velar Elevation: WFL  · Buccal Strength and Mobility: WFL  · Voice Prior to PO Intake: (wfl)  · Oral Musculature Comments: (grossly intact)    Bedside Swallow Eval:   Consistencies Assessed:  · Thin liquids X3 via straw  · Nectar thick liquids X2 via straw  · Puree X1   · Pt accepting very little reporting anxiety with initiation of swallow secondary to fear of choking and of odynophagia     Oral Phase:   · Decreased closure around utensil  · Prolonged mastication  · Poor oral acceptance   · Tongue thrust    Pharyngeal Phase:   · Coughing/choking- thin liquids X1  · no overt clinical signs/symptoms of aspiration- NTL and puree    Compensatory Strategies  · None    Treatment: will follow for ongoing assessment, limited cooperation with swallow eval    Assessment:     Richard Rome is a 79 y.o. male with dx of Intertrochanteric fracture of right femur he presents with moderate oropharyngeal dysphagia c/b purposefully delayed swallow initiation secondary to anxiety, odynophagia of an unclear etiology (intubation during surgery) and overt s/s of aspiration of thin liquids.     Goals:   Multidisciplinary Problems     SLP Goals        Problem: SLP Goal    Goal Priority Disciplines Outcome   SLP Goal    Low SLP    Description:  STGS  1. Pt will tolerate puree with NTL without overt s/s of aspiration  2. Pt will tolerate 4oz thin liquids via straw without overt s/s of aspiration.                     Plan:     · Patient to be seen:  3 x/week   · Plan of Care expires:  04/29/20  · Plan of Care reviewed with:  patient   · SLP Follow-Up:  Yes       Discharge recommendations:  other (see comments)(tbd)   Barriers to Discharge:  None    Time Tracking:     SLP Treatment Date:   04/19/20  Speech Start Time:  1610  Speech Stop Time:  1620     Speech Total Time (min):   10 min    Billable Minutes: Eval Swallow and Oral Function 10    Shanique Pugh CCC-SLP  04/19/2020

## 2020-04-19 NOTE — NURSING
Bedside Report given to ANGÉLICA Castelan. Walking rounds completed. Visualized and assessed patient NAD noted. Safety precautions maintained and call light within reach.     Chart check completed.

## 2020-04-19 NOTE — PLAN OF CARE
Problem: Occupational Therapy Goal  Goal: Occupational Therapy Goal  Description  Goals to be met by: 5/3/20    Patient will increase functional independence with ADLs by performing:    UE Dressing with Stand-by Assistance.  LE Dressing with Contact Guard Assistance.  Grooming while standing at sink with Contact Guard Assistance.  Supine to sit with Stand-by Assistance.  Step transfer with Contact Guard Assistance  Toilet transfer to bedside commode with Contact Guard Assistance.  Upper extremity exercise program x10 reps per handout, with assistance as needed.     Outcome: Ongoing, Progressing   The patient participated in OT eval. The patient required mod assist for bed mobility and to stand using a RW. Patient will benefit from OT to address functional deficits.   The patient will benefit from SNF.

## 2020-04-19 NOTE — ASSESSMENT & PLAN NOTE
Fall at home causing fracture. With history of multiple malignancies- unknown if pathologic. CT pending.   Ortho consulted  Given multiple strokes, heart attacks with now elevated troponin, CHF with unknown EF, renal insufficiency, Afib, Cardiology consulted for pre-op assessment-- high risk for surgery  Pain control- scheduled tylenol, PRN oxycodone, PRN dilaudid  Bowel regimen- increase for constipation  PT, OT   On home hospice-- will need to discuss dispo after PT, OT consults

## 2020-04-19 NOTE — ASSESSMENT & PLAN NOTE
No active or prior treatment per daughter  Follows at [facility]  On hospice for this for the past year  With urinary retention- resume flomax. In and out cath as needed

## 2020-04-19 NOTE — PLAN OF CARE
Problem: Physical Therapy Goal  Goal: Physical Therapy Goal  Description  Goals to be met by: 5/3/20     Patient will increase functional independence with mobility by performin. Supine to sit with Stand-by Assistance  2. Rolling to Left and Right with Stand-by Assistance.  3. Sit to stand transfer with Stand-by Assistance  4. Gait  x 150 feet with Contact Guard Assistance using Rolling Walker.   5. Sitting at edge of bed x20 minutes with Modified [place]  6. Lower extremity exercise program x15 reps per handout, with independence     Outcome: Ongoing, Progressing     Pt tolerated PT evaluation well with no reports of increased pain. Pt will continue to benefit from skilled therapy to improve gait mechanics, reduce the risk for falls and perform ADLs and transfers at OF

## 2020-04-19 NOTE — PROGRESS NOTES
Bladder scan volume was 439 ml. Patient has not voided today and has tried multiple times but has been unsuccessful. Patient complains of the urge to void. Dr.Van Carlson notified, new orders to place indwelling severino catheter.     Indwelling severino inserted. 500 ml of yellow urine returned. Patient tolerated procedure well.

## 2020-04-20 LAB
ANION GAP SERPL CALC-SCNC: 7 MMOL/L (ref 8–16)
BASOPHILS # BLD AUTO: 0.01 K/UL (ref 0–0.2)
BASOPHILS NFR BLD: 0.1 % (ref 0–1.9)
BUN SERPL-MCNC: 63 MG/DL (ref 8–23)
CALCIUM SERPL-MCNC: 8.6 MG/DL (ref 8.7–10.5)
CHLORIDE SERPL-SCNC: 105 MMOL/L (ref 95–110)
CO2 SERPL-SCNC: 26 MMOL/L (ref 23–29)
CREAT SERPL-MCNC: 1.4 MG/DL (ref 0.5–1.4)
DIFFERENTIAL METHOD: ABNORMAL
EOSINOPHIL # BLD AUTO: 0 K/UL (ref 0–0.5)
EOSINOPHIL NFR BLD: 0.1 % (ref 0–8)
ERYTHROCYTE [DISTWIDTH] IN BLOOD BY AUTOMATED COUNT: 15.9 % (ref 11.5–14.5)
EST. GFR  (AFRICAN AMERICAN): 55 ML/MIN/1.73 M^2
EST. GFR  (NON AFRICAN AMERICAN): 47 ML/MIN/1.73 M^2
GLUCOSE SERPL-MCNC: 101 MG/DL (ref 70–110)
HCT VFR BLD AUTO: 25.7 % (ref 40–54)
HGB BLD-MCNC: 8 G/DL (ref 14–18)
IMM GRANULOCYTES # BLD AUTO: 0.02 K/UL (ref 0–0.04)
IMM GRANULOCYTES NFR BLD AUTO: 0.3 % (ref 0–0.5)
LYMPHOCYTES # BLD AUTO: 2.2 K/UL (ref 1–4.8)
LYMPHOCYTES NFR BLD: 28.4 % (ref 18–48)
MCH RBC QN AUTO: 25.2 PG (ref 27–31)
MCHC RBC AUTO-ENTMCNC: 31.1 G/DL (ref 32–36)
MCV RBC AUTO: 81 FL (ref 82–98)
MONOCYTES # BLD AUTO: 0.6 K/UL (ref 0.3–1)
MONOCYTES NFR BLD: 7.9 % (ref 4–15)
NEUTROPHILS # BLD AUTO: 4.9 K/UL (ref 1.8–7.7)
NEUTROPHILS NFR BLD: 63.2 % (ref 38–73)
NRBC BLD-RTO: 0 /100 WBC
PLATELET # BLD AUTO: 232 K/UL (ref 150–350)
PMV BLD AUTO: 9.8 FL (ref 9.2–12.9)
POTASSIUM SERPL-SCNC: 4 MMOL/L (ref 3.5–5.1)
RBC # BLD AUTO: 3.18 M/UL (ref 4.6–6.2)
SODIUM SERPL-SCNC: 138 MMOL/L (ref 136–145)
WBC # BLD AUTO: 7.76 K/UL (ref 3.9–12.7)

## 2020-04-20 PROCEDURE — 97535 SELF CARE MNGMENT TRAINING: CPT

## 2020-04-20 PROCEDURE — 80048 BASIC METABOLIC PNL TOTAL CA: CPT

## 2020-04-20 PROCEDURE — 36415 COLL VENOUS BLD VENIPUNCTURE: CPT

## 2020-04-20 PROCEDURE — 30200315 PPD INTRADERMAL TEST REV CODE 302: Performed by: HOSPITALIST

## 2020-04-20 PROCEDURE — 25000003 PHARM REV CODE 250: Performed by: HOSPITALIST

## 2020-04-20 PROCEDURE — 21400001 HC TELEMETRY ROOM

## 2020-04-20 PROCEDURE — 94761 N-INVAS EAR/PLS OXIMETRY MLT: CPT

## 2020-04-20 PROCEDURE — 63600175 PHARM REV CODE 636 W HCPCS: Performed by: ORTHOPAEDIC SURGERY

## 2020-04-20 PROCEDURE — 86580 TB INTRADERMAL TEST: CPT | Performed by: HOSPITALIST

## 2020-04-20 PROCEDURE — 85025 COMPLETE CBC W/AUTO DIFF WBC: CPT

## 2020-04-20 RX ORDER — BRIMONIDINE TARTRATE 2 MG/ML
1 SOLUTION/ DROPS OPHTHALMIC 2 TIMES DAILY
Status: DISCONTINUED | OUTPATIENT
Start: 2020-04-20 | End: 2020-04-21

## 2020-04-20 RX ORDER — TAMSULOSIN HYDROCHLORIDE 0.4 MG/1
0.4 CAPSULE ORAL NIGHTLY
Status: DISCONTINUED | OUTPATIENT
Start: 2020-04-21 | End: 2020-04-21 | Stop reason: HOSPADM

## 2020-04-20 RX ADMIN — HEPARIN SODIUM 5000 UNITS: 5000 INJECTION INTRAVENOUS; SUBCUTANEOUS at 09:04

## 2020-04-20 RX ADMIN — BISACODYL 10 MG: 5 TABLET, COATED ORAL at 10:04

## 2020-04-20 RX ADMIN — POLYETHYLENE GLYCOL 3350 17 G: 17 POWDER, FOR SOLUTION ORAL at 09:04

## 2020-04-20 RX ADMIN — CITALOPRAM HYDROBROMIDE 20 MG: 20 TABLET ORAL at 10:04

## 2020-04-20 RX ADMIN — GUAIFENESIN AND DEXTROMETHORPHAN HYDROBROMIDE 1 TABLET: 600; 30 TABLET, EXTENDED RELEASE ORAL at 09:04

## 2020-04-20 RX ADMIN — DOCUSATE SODIUM AND SENNOSIDES 2 TABLET: 50; 8.6 TABLET, FILM COATED ORAL at 09:04

## 2020-04-20 RX ADMIN — TUBERCULIN PURIFIED PROTEIN DERIVATIVE 5 UNITS: 5 INJECTION, SOLUTION INTRADERMAL at 06:04

## 2020-04-20 RX ADMIN — OXYCODONE 5 MG: 5 TABLET ORAL at 10:04

## 2020-04-20 RX ADMIN — ACETAMINOPHEN 1000 MG: 500 TABLET ORAL at 06:04

## 2020-04-20 RX ADMIN — DOCUSATE SODIUM AND SENNOSIDES 2 TABLET: 50; 8.6 TABLET, FILM COATED ORAL at 10:04

## 2020-04-20 RX ADMIN — BRIMONIDINE TARTRATE 1 DROP: 2 SOLUTION OPHTHALMIC at 06:04

## 2020-04-20 RX ADMIN — OXYCODONE 5 MG: 5 TABLET ORAL at 03:04

## 2020-04-20 RX ADMIN — ACETAMINOPHEN 1000 MG: 500 TABLET ORAL at 09:04

## 2020-04-20 RX ADMIN — OXYCODONE 5 MG: 5 TABLET ORAL at 06:04

## 2020-04-20 RX ADMIN — HEPARIN SODIUM 5000 UNITS: 5000 INJECTION INTRAVENOUS; SUBCUTANEOUS at 02:04

## 2020-04-20 RX ADMIN — HEPARIN SODIUM 5000 UNITS: 5000 INJECTION INTRAVENOUS; SUBCUTANEOUS at 06:04

## 2020-04-20 RX ADMIN — DORZOLAMIDE HYDROCHLORIDE 1 DROP: 20 SOLUTION/ DROPS OPHTHALMIC at 06:04

## 2020-04-20 RX ADMIN — PANTOPRAZOLE SODIUM 40 MG: 40 TABLET, DELAYED RELEASE ORAL at 10:04

## 2020-04-20 RX ADMIN — GUAIFENESIN AND DEXTROMETHORPHAN HYDROBROMIDE 1 TABLET: 600; 30 TABLET, EXTENDED RELEASE ORAL at 10:04

## 2020-04-20 RX ADMIN — POLYETHYLENE GLYCOL 3350 17 G: 17 POWDER, FOR SOLUTION ORAL at 10:04

## 2020-04-20 RX ADMIN — LORAZEPAM 0.5 MG: 0.5 TABLET ORAL at 11:04

## 2020-04-20 NOTE — ASSESSMENT & PLAN NOTE
Trop 0.173 on admit. EKG no acute ST-T changes. History of MI and CAD with ischemic cardiomyopathy/CHF (per daughter). Patient denies ACS symptoms prior to fall.  Cardiology evaluated. Doubt ACS. Consider stress test outpatient.

## 2020-04-20 NOTE — PT/OT/SLP PROGRESS
Physical Therapy      Patient Name:  Richard Rome   MRN:  [id number]    Patient declined PT. C/o chest and stomach pain; requested PT close window shades. D/w Dr. Elaine. Will f/u tomorrow.     Andie Moseley, PT

## 2020-04-20 NOTE — PT/OT/SLP PROGRESS
Speech Language Pathology Treatment    Patient Name:  Richard Roem   MRN:  [id number]  Admitting Diagnosis: Intertrochanteric fracture of right femur    Recommendations:                 General Recommendations:  Dysphagia therapy  Diet recommendations:  Puree, Liquid Diet Level: Nectar Thick Crushed meds in puree  Aspiration Precautions: 1 bite/sip at a time, Alternate means of nutrition/hydration, HOB to 90 degrees, Meds crushed in puree, Monitor for s/s of aspiration, Remain upright 30 minutes post meal, Small bites/sips and Standard aspiration precautions   General Precautions: Standard, fall, nectar thick, aspiration, hearing impaired  Communication strategies:  provide increased time to answer and [place] (increase volume)    Subjective   ST entered room, pt was in bed with emesis bag, red streaks were on the bag. Hard peppermint candy and thin liquids were present on b/s table, pt stated that a nurse gave them to him this morning.    Objective:     Has the patient been evaluated by SLP for swallowing?   Yes  Keep patient NPO? No   Current Respiratory Status: room air      Pt refused participation in PO trials this date. He stated that he was feeling weak, tired and had experienced increased coughing/choking this morning following failed attempts to eat the peppermint candy.     Education provided to pt regarding rationale for his current diet/liquid level and risk of non-compliance with modified diet rec: puree/NTL and crushed meds. ST removed remaining peppermints from pt's room.     ST consulted with pt's nurse regarding ST recommendations made following pt's swallowing evaluation on 4/19/20. Nurse provided with several packets of thickener.     Assessment:     Richard Rome is a 79 y.o. male with dx of Intertrochanteric fracture of right femur he presents with moderate oropharyngeal dysphagia c/b overt s/s of aspiration following failed attempts for mastication/deglutition of reg solids and thin liquids resulting  in pt c/o odynophagia.     Goals:   Multidisciplinary Problems     SLP Goals        Problem: SLP Goal    Goal Priority Disciplines Outcome   SLP Goal    Low SLP    Description:  STGS  1. Pt will tolerate puree with NTL without overt s/s of aspiration  2. Pt will tolerate 4oz thin liquids via straw without overt s/s of aspiration.                     Plan:     · Patient to be seen:  3 x/week   · Plan of Care expires:  04/29/20  · Plan of Care reviewed with:  patient   · SLP Follow-Up:  Yes       Discharge recommendations:  nursing facility, skilled   Barriers to Discharge:  None    Time Tracking:     SLP Treatment Date:   04/20/20  Speech Start Time:  0955  Speech Stop Time:  1005     Speech Total Time (min):  10 min    Billable Minutes: [place] [facility]/Home Management Training 10min    Radha Molina CF-SLP  04/20/2020

## 2020-04-20 NOTE — PT/OT/SLP PROGRESS
Occupational Therapy      Patient Name:  Richard Rome   MRN:  [id number]    Patient not seen today secondary to Other (Pt declined therapy 2* c/o chest pain and stomach pain. MD and nursing notified.  Will follow-up as able.     KRISHNA Frazier  4/20/2020

## 2020-04-20 NOTE — ASSESSMENT & PLAN NOTE
Fall at home. CT not suggestive of underlying pathology.   S/P ORIF on 4/17/20.   SNF recommended per PT/OT. Will f/u with patients daughter and Case Management.

## 2020-04-20 NOTE — PROGRESS NOTES
"[facility] Medicine  Progress Note    Patient Name: Richard Rome  MRN: [id number]  Patient Class: IP- Inpatient   Admission Date: 4/17/2020  Length of Stay: 3 days  Attending Physician: Beth Elaine MD  Primary Care Provider: Edvin Reyes MD        Subjective:     Principal Problem:Intertrochanteric fracture of right femur    HPI:  Mr Richard Rome is a 79 y.o. man with prostate cancer, CHF, stroke who presents after fall. He lives at home alone in an apartment. He walks with a rollator at baseline but does have issues with balance. He fell on Friday (unclear if he means last Friday or this Friday). Then he fell again today when he was getting out of his chair into his walker. He denies hitting his head, but he said that everything "went black." He denies chest pain, shortness of breath, palpitations, presyncope prior to the fall. He denies biting his tongue and bowel/bladder incontinence. He currently has pain in his right groin that he cannot describe more than "it hurts." He denies pain elsewhere. He says he is also having difficulty urinating and has been trying to urinate for the last 45 minutes.     In ED, found to have acute mildly displaced intertrochanteric fracture of the proximal right femur. Also with Afib on arrival-- unknown if new vs old, elevated troponin without symptoms of ACS, and impaired renal function- unknown if new vs old. 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). He also says he has CHF-- unknown last EF but denies shortness of breath, orthopnea, PND, leg swelling, abdominal distension.     Called daughter Odilia Rome. Daughter says he fell a week ago, 2 days ago. She says he is on hospice and has a hospice nurse that checks on him. He is on hospice for prostate cancer for the last year. " He has not received any treatment for prostate cancer. He has multiple strokes with no residual deficits. He has had multiple heart attacks in the past- last was several years ago. Daughter confirms that he does have a history of Afib but does not know about any kidney dysfunction.     Overview/Hospital Course:  Admitted with R proximal femur intertrochanteric fracture. Also with Afib rate controlled, elevated troponin unknown if ACS vs demand ischemia, and impaired renal function that is likely chronic. Cardiology consulted. High risk for surgery. Ortho consulted. Taken to OR 4/17 for R hip ORIF using dynamic hip screw. Post op, complained of chest pain; EKG no ischemia but is in Afib, troponin similar to prior, unable to take nitrate due to intolerance, and did not improve with dilaudid- unlikely ACS. Pain associated with coughing and improving with mucinex.     Interval History:   Patient a little upset this morning. Wants to make sure he is getting his home meds the way he normally does. Otherwise no specific complaints. Easily calmed and able to assure I will discuss plans with his daughter. Thinks he may have had a bm yesterday. Eating a little.     Review of Systems   Constitutional: Negative for fever.   Respiratory: Negative for shortness of breath.    Gastrointestinal: Negative for abdominal pain and constipation.     Objective:     Vital Signs (Most Recent):  Temp: 99.2 °F (37.3 °C) (04/20/20 0746)  Pulse: 69 (04/20/20 0746)  Resp: 16 (04/20/20 0746)  BP: 100/62 (04/20/20 0746)  SpO2: 95 % (04/20/20 0746) Vital Signs (24h Range):  Temp:  [97.6 °F (36.4 °C)-99.2 °F (37.3 °C)] 99.2 °F (37.3 °C)  Pulse:  [55-69] 69  Resp:  [16-18] 16  SpO2:  [93 %-97 %] 95 %  BP: (100-126)/(57-76) 100/62     Weight: 72.1 kg (159 lb)  Body mass index is 21.56 kg/m².    Intake/Output Summary (Last 24 hours) at 4/20/2020 1014  Last data filed at 4/20/2020 0600  Gross per 24 hour   Intake --   Output 500 ml   Net -500 ml       Physical Exam   Neurological: He is alert.   Intact sensation and movement of distal RLE.   Skin:   Right hip with dry bandaging over surgical area. No active bleeding. Tenderness appropriate for situation. Good perfusion to distal RLE.       Significant Labs:   Recent Results (from the past 24 hour(s))   CBC auto differential    Collection Time: 04/20/20  6:15 AM   Result Value Ref Range    WBC 7.76 3.90 - 12.70 K/uL    RBC 3.18 (L) 4.60 - 6.20 M/uL    Hemoglobin 8.0 (L) 14.0 - 18.0 g/dL    Hematocrit 25.7 (L) 40.0 - 54.0 %    Mean Corpuscular Volume 81 (L) 82 - 98 fL    Mean Corpuscular Hemoglobin 25.2 (L) 27.0 - 31.0 pg    Mean Corpuscular Hemoglobin Conc 31.1 (L) 32.0 - 36.0 g/dL    RDW 15.9 (H) 11.5 - 14.5 %    Platelets 232 150 - 350 K/uL    MPV 9.8 9.2 - 12.9 fL    Immature Granulocytes 0.3 0.0 - 0.5 %    Gran # (ANC) 4.9 1.8 - 7.7 K/uL    Immature Grans (Abs) 0.02 0.00 - 0.04 K/uL    Lymph # 2.2 1.0 - 4.8 K/uL    Mono # 0.6 0.3 - 1.0 K/uL    Eos # 0.0 0.0 - 0.5 K/uL    Baso # 0.01 0.00 - 0.20 K/uL    nRBC 0 0 /100 WBC    Gran% 63.2 38.0 - 73.0 %    Lymph% 28.4 18.0 - 48.0 %    Mono% 7.9 4.0 - 15.0 %    Eosinophil% 0.1 0.0 - 8.0 %    Basophil% 0.1 0.0 - 1.9 %    Differential Method Automated    Basic metabolic panel    Collection Time: 04/20/20  6:15 AM   Result Value Ref Range    Sodium 138 136 - 145 mmol/L    Potassium 4.0 3.5 - 5.1 mmol/L    Chloride 105 95 - 110 mmol/L    CO2 26 23 - 29 mmol/L    Glucose 101 70 - 110 mg/dL    BUN, Bld 63 (H) 8 - 23 mg/dL    Creatinine 1.4 0.5 - 1.4 mg/dL    Calcium 8.6 (L) 8.7 - 10.5 mg/dL    Anion Gap 7 (L) 8 - 16 mmol/L    eGFR if African American 55 (A) >60 mL/min/1.73 m^2    eGFR if non African American 47 (A) >60 mL/min/1.73 m^2       Assessment/Plan:      * Intertrochanteric fracture of right femur  Fall at home. CT not suggestive of underlying pathology.   S/P ORIF on 4/17/20.   SNF recommended per PT/OT. Will f/u with patients daughter and Case Management.  Overall, appears to be recovering well. Will likely trial without severino cath today (urine retention) but otherwise medically stable. Hopeful transfer to SNF within next day or 2.     Fall  Causing hip fracture  PT, OT    Prostate cancer metastatic to bone  No active or prior treatment per daughter. Follows at [facility]. On hospice for this for the past year.  Acute urinary retention. Resumed flomax. Severino in place. May trial without today.    Urinary retention  Acute need for severino consuelo-operatively. On Flomax. May trial without today.    Renal insufficiency  Improving. ROSI with creatinine 1.8 now trending down to 1.4.     Pulmonary hypertension  As noted on TTE. Likely due to left heart disease (WHO group 2).    Coronary artery disease involving native coronary artery of native heart without angina pectoris  History of multiple MI, last was a few years ago per daughter. Unknown home med list.    Longstanding persistent atrial fibrillation  Per daughter, this is not new. Rate controlled currently without rx.  Poor candidate for anticoagulation given falls at home, but CHADS-VASc is high.     Chronic diastolic congestive heart failure  Stable.    History of renal cell carcinoma  S/P left ureterectomy in past.    Elevated troponin  Trop 0.173 on admit. EKG no acute ST-T changes. History of MI and CAD with ischemic cardiomyopathy/CHF (per daughter). Patient denies ACS symptoms prior to fall.  Cardiology evaluated. Doubt ACS. Consider stress test outpatient.    Anemia  Hb 8 post-op, stable.    History of bladder cancer  Not active per daughter. Per last Onc note (in [facility], unable to access [facility] records), has been treated with TUR resection.        VTE Risk Mitigation (From admission, onward)         Ordered     heparin (porcine) injection 5,000 Units  Every 8 hours      04/17/20 1629     IP VTE HIGH RISK PATIENT  Once      04/17/20 1012     Place sequential compression device  Until discontinued       04/17/20 1012                      Hola Rey MD  Department of Hospital Medicine   [facility]

## 2020-04-20 NOTE — NURSING
Patient refused Miralax and eye drops during medication administration. Patient states he has already received Miralax for today. Patient also states it is not time for his eye drops. Patient continue to state he takes them at 6am and 6pm every day. Medication recon provided. Patient states understanding

## 2020-04-20 NOTE — ASSESSMENT & PLAN NOTE
No active or prior treatment per daughter. Follows at [facility]. On hospice for this for the past year.  Acute urinary retention. Resumed flomax. Rogers in place. May trial without today.

## 2020-04-20 NOTE — ASSESSMENT & PLAN NOTE
Not active per daughter. Per last Onc note (in [facility] system, unable to access [facility] records), has been treated with TUR resection.

## 2020-04-20 NOTE — PLAN OF CARE
TN called in locet to state and sent pasrr to [place], awaiting 142..Maddie Ogden RN, BSN, [facility]  4/20/2020 04/20/20 1802   Post-Acute Status   Post-Acute Authorization Placement   Post-Acute Placement Status Referrals Sent   Patient choice form signed by patient/caregiver List with quality metrics by geographic area provided   Discharge Delays None known at this time   Discharge Plan   Discharge Plan A Skilled Nursing Facility   Discharge Plan B Hospice/home;Home   .Maddie Ogden RN, BSN, [facility]  4/20/2020

## 2020-04-20 NOTE — PLAN OF CARE
Pt not progressing d/t decreased compliance with puree consistency diet and nectar thickened liquid. ST will follow

## 2020-04-20 NOTE — ASSESSMENT & PLAN NOTE
Per daughter, this is not new. Rate controlled currently without rx.  Poor candidate for anticoagulation given falls at home, but CHADS-VASc is high.

## 2020-04-20 NOTE — NURSING
Attempted to assess pt this AM at 0745, pt started yelling about his eye drops and flomax not being on the right schedule. Dr. Rey entered the room at this time and talked with pt about his medicines and stated he would make changes.     At 1019 entered room to administer medications.pt more at ease with his meds.  Pt c/o pain in his hip. Pt vitals stable. Returned at 1036 to administer oxycodone. Gave pt his medications in thickened apple juice and crushed meds to put in vanilla pudding. Pt repositioned and pt tolerated well.     At 1432 pt given meds and repositioned. Pt's daughter sent up vanilla ensure and oatmeal. Pt agreed to drink the ensure. Verified with ST that pt could have oatmeal. ST stated he could.     1645 pt offered oatmeal, pt denies at this time.

## 2020-04-20 NOTE — SUBJECTIVE & OBJECTIVE
Interval History:   Patient a little upset this morning. Wants to make sure he is getting his home meds the way he normally does. Otherwise no specific complaints. Easily calmed and able to assure I will discuss plans with his daughter. Thinks he may have had a bm yesterday. Eating a little.     Review of Systems   Constitutional: Negative for fever.   Respiratory: Negative for shortness of breath.    Gastrointestinal: Negative for abdominal pain and constipation.     Objective:     Vital Signs (Most Recent):  Temp: 99.2 °F (37.3 °C) (04/20/20 0746)  Pulse: 69 (04/20/20 0746)  Resp: 16 (04/20/20 0746)  BP: 100/62 (04/20/20 0746)  SpO2: 95 % (04/20/20 0746) Vital Signs (24h Range):  Temp:  [97.6 °F (36.4 °C)-99.2 °F (37.3 °C)] 99.2 °F (37.3 °C)  Pulse:  [55-69] 69  Resp:  [16-18] 16  SpO2:  [93 %-97 %] 95 %  BP: (100-126)/(57-76) 100/62     Weight: 72.1 kg (159 lb)  Body mass index is 21.56 kg/m².    Intake/Output Summary (Last 24 hours) at 4/20/2020 1014  Last data filed at 4/20/2020 0600  Gross per 24 hour   Intake --   Output 500 ml   Net -500 ml      Physical Exam   Neurological: He is alert.   Intact sensation and movement of distal RLE.   Skin:   Right hip with dry bandaging over surgical area. No active bleeding. Tenderness appropriate for situation. Good perfusion to distal RLE.       Significant Labs:   Recent Results (from the past 24 hour(s))   CBC auto differential    Collection Time: 04/20/20  6:15 AM   Result Value Ref Range    WBC 7.76 3.90 - 12.70 K/uL    RBC 3.18 (L) 4.60 - 6.20 M/uL    Hemoglobin 8.0 (L) 14.0 - 18.0 g/dL    Hematocrit 25.7 (L) 40.0 - 54.0 %    Mean Corpuscular Volume 81 (L) 82 - 98 fL    Mean Corpuscular Hemoglobin 25.2 (L) 27.0 - 31.0 pg    Mean Corpuscular Hemoglobin Conc 31.1 (L) 32.0 - 36.0 g/dL    RDW 15.9 (H) 11.5 - 14.5 %    Platelets 232 150 - 350 K/uL    MPV 9.8 9.2 - 12.9 fL    Immature Granulocytes 0.3 0.0 - 0.5 %    Gran # (ANC) 4.9 1.8 - 7.7 K/uL    Immature Grans (Abs)  0.02 0.00 - 0.04 K/uL    Lymph # 2.2 1.0 - 4.8 K/uL    Mono # 0.6 0.3 - 1.0 K/uL    Eos # 0.0 0.0 - 0.5 K/uL    Baso # 0.01 0.00 - 0.20 K/uL    nRBC 0 0 /100 WBC    Gran% 63.2 38.0 - 73.0 %    Lymph% 28.4 18.0 - 48.0 %    Mono% 7.9 4.0 - 15.0 %    Eosinophil% 0.1 0.0 - 8.0 %    Basophil% 0.1 0.0 - 1.9 %    Differential Method Automated    Basic metabolic panel    Collection Time: 04/20/20  6:15 AM   Result Value Ref Range    Sodium 138 136 - 145 mmol/L    Potassium 4.0 3.5 - 5.1 mmol/L    Chloride 105 95 - 110 mmol/L    CO2 26 23 - 29 mmol/L    Glucose 101 70 - 110 mg/dL    BUN, Bld 63 (H) 8 - 23 mg/dL    Creatinine 1.4 0.5 - 1.4 mg/dL    Calcium 8.6 (L) 8.7 - 10.5 mg/dL    Anion Gap 7 (L) 8 - 16 mmol/L    eGFR if African American 55 (A) >60 mL/min/1.73 m^2    eGFR if non African American 47 (A) >60 mL/min/1.73 m^2

## 2020-04-20 NOTE — PROGRESS NOTES
S: Pain in hip well controlled.  Stood at EOB yesterday with PT. Did some side steps but did not ambulate  Not able to tolerate much PO intake because of loss of appetite, feels full quickly. States that he has only eaten 2 cups of applesauce since admit   Feels dizzy when he sits up     O:  Temp:  [97.6 °F (36.4 °C)-99.2 °F (37.3 °C)] 99.2 °F (37.3 °C)  Pulse:  [55-69] 69  Resp:  [16-18] 16  SpO2:  [93 %-97 %] 95 %  BP: (100-126)/(57-76) 100/62    PE:  NAD  right lower extremity:  Aquacell dressing in place without visible drainage. Not taken down.   SCDs in place  Rogers out  Ltsi s/s/sp/dp/t  + ehl/fhl/ta/gs  2+ DP     Laboratory:  Most Recent Data:  CBC:   Lab Results   Component Value Date    WBC 7.76 04/20/2020    HGB 8.0 (L) 04/20/2020    HCT 25.7 (L) 04/20/2020     04/20/2020    MCV 81 (L) 04/20/2020    RDW 15.9 (H) 04/20/2020     BMP:   Lab Results   Component Value Date     04/20/2020    K 4.0 04/20/2020     04/20/2020    CO2 26 04/20/2020    BUN 63 (H) 04/20/2020    CREATININE 1.4 04/20/2020     04/20/2020    CALCIUM 8.6 (L) 04/20/2020       A/P: 79 y.o.male s/p ORIF of R intertroch with SHS on 4/17/20  - Abx: ancef x 24 h postop - complete  - DVT ppx: SCDs, heparin.  Will need  mg BID x 4 weeks on discharge   - PT OT - WBAT RLE with walker  - H/H stable  - Discussed adding ensure or boost with primary team.  Dizziness may be related to poor PO intake.

## 2020-04-21 VITALS
TEMPERATURE: 98 F | DIASTOLIC BLOOD PRESSURE: 70 MMHG | SYSTOLIC BLOOD PRESSURE: 127 MMHG | WEIGHT: 159 LBS | BODY MASS INDEX: 21.54 KG/M2 | HEIGHT: 72 IN | RESPIRATION RATE: 16 BRPM | OXYGEN SATURATION: 99 % | HEART RATE: 60 BPM

## 2020-04-21 PROBLEM — C64.9 PAPILLARY RENAL CELL CARCINOMA: Status: ACTIVE | Noted: 2017-12-22

## 2020-04-21 LAB
ANION GAP SERPL CALC-SCNC: 9 MMOL/L (ref 8–16)
BASOPHILS # BLD AUTO: 0.01 K/UL (ref 0–0.2)
BASOPHILS NFR BLD: 0.1 % (ref 0–1.9)
BUN SERPL-MCNC: 56 MG/DL (ref 8–23)
CALCIUM SERPL-MCNC: 8.8 MG/DL (ref 8.7–10.5)
CHLORIDE SERPL-SCNC: 106 MMOL/L (ref 95–110)
CO2 SERPL-SCNC: 25 MMOL/L (ref 23–29)
CREAT SERPL-MCNC: 1.3 MG/DL (ref 0.5–1.4)
DIFFERENTIAL METHOD: ABNORMAL
EOSINOPHIL # BLD AUTO: 0 K/UL (ref 0–0.5)
EOSINOPHIL NFR BLD: 0.1 % (ref 0–8)
ERYTHROCYTE [DISTWIDTH] IN BLOOD BY AUTOMATED COUNT: 16.5 % (ref 11.5–14.5)
EST. GFR  (AFRICAN AMERICAN): 60 ML/MIN/1.73 M^2
EST. GFR  (NON AFRICAN AMERICAN): 52 ML/MIN/1.73 M^2
GLUCOSE SERPL-MCNC: 93 MG/DL (ref 70–110)
HCT VFR BLD AUTO: 26.9 % (ref 40–54)
HGB BLD-MCNC: 8.4 G/DL (ref 14–18)
IMM GRANULOCYTES # BLD AUTO: 0.02 K/UL (ref 0–0.04)
IMM GRANULOCYTES NFR BLD AUTO: 0.3 % (ref 0–0.5)
LYMPHOCYTES # BLD AUTO: 2.3 K/UL (ref 1–4.8)
LYMPHOCYTES NFR BLD: 33 % (ref 18–48)
MCH RBC QN AUTO: 25.8 PG (ref 27–31)
MCHC RBC AUTO-ENTMCNC: 31.2 G/DL (ref 32–36)
MCV RBC AUTO: 83 FL (ref 82–98)
MONOCYTES # BLD AUTO: 0.5 K/UL (ref 0.3–1)
MONOCYTES NFR BLD: 7.5 % (ref 4–15)
NEUTROPHILS # BLD AUTO: 4.2 K/UL (ref 1.8–7.7)
NEUTROPHILS NFR BLD: 59 % (ref 38–73)
NRBC BLD-RTO: 0 /100 WBC
PLATELET # BLD AUTO: 261 K/UL (ref 150–350)
PMV BLD AUTO: 10.4 FL (ref 9.2–12.9)
POTASSIUM SERPL-SCNC: 3.9 MMOL/L (ref 3.5–5.1)
RBC # BLD AUTO: 3.26 M/UL (ref 4.6–6.2)
SODIUM SERPL-SCNC: 140 MMOL/L (ref 136–145)
WBC # BLD AUTO: 7.06 K/UL (ref 3.9–12.7)

## 2020-04-21 PROCEDURE — 63600175 PHARM REV CODE 636 W HCPCS: Performed by: ORTHOPAEDIC SURGERY

## 2020-04-21 PROCEDURE — 94761 N-INVAS EAR/PLS OXIMETRY MLT: CPT

## 2020-04-21 PROCEDURE — 85025 COMPLETE CBC W/AUTO DIFF WBC: CPT

## 2020-04-21 PROCEDURE — 97535 SELF CARE MNGMENT TRAINING: CPT

## 2020-04-21 PROCEDURE — 97530 THERAPEUTIC ACTIVITIES: CPT | Mod: CO

## 2020-04-21 PROCEDURE — 97116 GAIT TRAINING THERAPY: CPT

## 2020-04-21 PROCEDURE — 80048 BASIC METABOLIC PNL TOTAL CA: CPT

## 2020-04-21 PROCEDURE — 92526 ORAL FUNCTION THERAPY: CPT

## 2020-04-21 PROCEDURE — 36415 COLL VENOUS BLD VENIPUNCTURE: CPT

## 2020-04-21 PROCEDURE — 25000003 PHARM REV CODE 250: Performed by: INTERNAL MEDICINE

## 2020-04-21 PROCEDURE — 25000003 PHARM REV CODE 250: Performed by: HOSPITALIST

## 2020-04-21 RX ORDER — HYDROCODONE BITARTRATE AND ACETAMINOPHEN 7.5; 325 MG/1; MG/1
1 TABLET ORAL EVERY 6 HOURS PRN
Qty: 28 TABLET | Refills: 0 | Status: SHIPPED | OUTPATIENT
Start: 2020-04-21 | End: 2020-04-21 | Stop reason: SDUPTHER

## 2020-04-21 RX ORDER — LORAZEPAM 0.5 MG/1
0.25 TABLET ORAL EVERY 6 HOURS PRN
Qty: 20 TABLET | Refills: 0 | Status: SHIPPED | OUTPATIENT
Start: 2020-04-21 | End: 2020-04-21

## 2020-04-21 RX ORDER — BRIMONIDINE TARTRATE 2 MG/ML
1 SOLUTION/ DROPS OPHTHALMIC 3 TIMES DAILY
Status: DISCONTINUED | OUTPATIENT
Start: 2020-04-21 | End: 2020-04-21

## 2020-04-21 RX ORDER — BRIMONIDINE TARTRATE 2 MG/ML
1 SOLUTION/ DROPS OPHTHALMIC EVERY 12 HOURS
Status: DISCONTINUED | OUTPATIENT
Start: 2020-04-21 | End: 2020-04-21 | Stop reason: HOSPADM

## 2020-04-21 RX ORDER — HYDROCODONE BITARTRATE AND ACETAMINOPHEN 7.5; 325 MG/1; MG/1
1 TABLET ORAL EVERY 6 HOURS PRN
Qty: 28 TABLET | Refills: 0 | Status: SHIPPED | OUTPATIENT
Start: 2020-04-21

## 2020-04-21 RX ORDER — LORAZEPAM 0.5 MG/1
0.25 TABLET ORAL EVERY 6 HOURS PRN
Qty: 20 TABLET | Refills: 0 | Status: SHIPPED | OUTPATIENT
Start: 2020-04-21 | End: 2020-05-21

## 2020-04-21 RX ORDER — DORZOLAMIDE HCL 20 MG/ML
1 SOLUTION/ DROPS OPHTHALMIC 3 TIMES DAILY
Status: DISCONTINUED | OUTPATIENT
Start: 2020-04-21 | End: 2020-04-21

## 2020-04-21 RX ORDER — DORZOLAMIDE HCL 20 MG/ML
1 SOLUTION/ DROPS OPHTHALMIC EVERY 12 HOURS
Status: DISCONTINUED | OUTPATIENT
Start: 2020-04-21 | End: 2020-04-21 | Stop reason: HOSPADM

## 2020-04-21 RX ORDER — ASPIRIN 325 MG
325 TABLET ORAL 2 TIMES DAILY
Qty: 56 TABLET | Refills: 0 | Status: SHIPPED | OUTPATIENT
Start: 2020-04-21 | End: 2020-05-19

## 2020-04-21 RX ADMIN — HEPARIN SODIUM 5000 UNITS: 5000 INJECTION INTRAVENOUS; SUBCUTANEOUS at 05:04

## 2020-04-21 RX ADMIN — GUAIFENESIN AND DEXTROMETHORPHAN HYDROBROMIDE 1 TABLET: 600; 30 TABLET, EXTENDED RELEASE ORAL at 09:04

## 2020-04-21 RX ADMIN — BISACODYL 10 MG: 5 TABLET, COATED ORAL at 09:04

## 2020-04-21 RX ADMIN — ACETAMINOPHEN 1000 MG: 500 TABLET ORAL at 01:04

## 2020-04-21 RX ADMIN — BRIMONIDINE TARTRATE 1 DROP: 2 SOLUTION OPHTHALMIC at 05:04

## 2020-04-21 RX ADMIN — OXYCODONE 5 MG: 5 TABLET ORAL at 07:04

## 2020-04-21 RX ADMIN — CITALOPRAM HYDROBROMIDE 20 MG: 20 TABLET ORAL at 09:04

## 2020-04-21 RX ADMIN — POLYETHYLENE GLYCOL 3350 17 G: 17 POWDER, FOR SOLUTION ORAL at 12:04

## 2020-04-21 RX ADMIN — HEPARIN SODIUM 5000 UNITS: 5000 INJECTION INTRAVENOUS; SUBCUTANEOUS at 01:04

## 2020-04-21 RX ADMIN — DOCUSATE SODIUM AND SENNOSIDES 2 TABLET: 50; 8.6 TABLET, FILM COATED ORAL at 09:04

## 2020-04-21 RX ADMIN — ACETAMINOPHEN 1000 MG: 500 TABLET ORAL at 05:04

## 2020-04-21 RX ADMIN — PANTOPRAZOLE SODIUM 40 MG: 40 TABLET, DELAYED RELEASE ORAL at 09:04

## 2020-04-21 RX ADMIN — DORZOLAMIDE HYDROCHLORIDE 1 DROP: 20 SOLUTION/ DROPS OPHTHALMIC at 05:04

## 2020-04-21 RX ADMIN — OXYCODONE 5 MG: 5 TABLET ORAL at 01:04

## 2020-04-21 NOTE — PLAN OF CARE
Problem: Physical Therapy Goal  Goal: Physical Therapy Goal  Description  Goals to be met by: 5/3/20     Patient will increase functional independence with mobility by performin. Supine to sit with Stand-by Assistance  2. Rolling to Left and Right with Stand-by Assistance.  3. Sit to stand transfer with Stand-by Assistance  4. Gait  x 150 feet with Contact Guard Assistance using Rolling Walker.   5. Sitting at edge of bed x20 minutes with Modified [place]  6. Lower extremity exercise program x15 reps per handout, with independence     Outcome: Ongoing, Progressing    Daily. SNF

## 2020-04-21 NOTE — NURSING
Pt yelling at nurse, stating that he is due another eye gtt at this time.  Explained to pt that brimonidine eye gtt is the only eye gtt scheduled at this time, and he will receive his other eye gtt, dorzolamide, at 0900.  Pt called family member on telephone complaining that we are not giving him his eye gtts.

## 2020-04-21 NOTE — PT/OT/SLP PROGRESS
Speech Language Pathology Treatment    Patient Name:  Richard Rome   MRN:  [id number]  Admitting Diagnosis: Intertrochanteric fracture of right femur    Recommendations:                 General Recommendations:  Dysphagia therapy  Diet recommendations:  Puree, Liquid Diet Level: Nectar Thick Crushed meds in puree  Aspiration Precautions: 1 bite/sip at a time, Alternate means of nutrition/hydration, HOB to 90 degrees, Meds crushed in puree, Monitor for s/s of aspiration, Remain upright 30 minutes post meal, Small bites/sips and Standard aspiration precautions   General Precautions: Standard, fall, nectar thick, aspiration, hearing  impaired  Communication strategies:  provide increased time to answer and [place]  (increase volume)    Subjective   Pt awake and alert. Pt cooperative and motivated to participate in ST session. HOB adjusted to upright position. Per nursing, pt tolerated whole pills without coughing/choking.  Objective:     Has the patient been evaluated by SLP for swallowing?   Yes  Keep patient NPO? No   Current Respiratory Status: room air      Pt assessed with PO trials of 4 oz TL via straw and puree. Across trials no overt s/s of aspiration observed. Pt educated on the importance of monitoring food choices d/t dysphagia and use of safe swallow strategies. Pt also instructed in use of effortful swallow.     Assessment:     Richard Rome is a 79 y.o. male with dx of Intertrochanteric fracture of right femur he presents with moderate oropharyngeal dysphagia c/b overt s/s of aspiration following failed attempts for mastication/deglutition of reg solids and thin liquids resulting in pt c/o odynophagia.     Goals:   Multidisciplinary Problems     SLP Goals        Problem: SLP Goal    Goal Priority Disciplines Outcome   SLP Goal    Low SLP Ongoing, Progressing   Description:  STGS  1. Pt will tolerate puree with NTL without overt s/s of aspiration  2. Pt will tolerate 4oz thin liquids via straw without overt s/s of  aspiration.                     Plan:     · Patient to be seen:  3 x/week   · Plan of Care expires:  04/29/20  · Plan of Care reviewed with:  patient   · SLP Follow-Up:  Yes       Discharge recommendations:  nursing facility, skilled   Barriers to Discharge:  None    Time Tracking:     SLP Treatment Date:   04/21/20  Speech Start Time:  1020  Speech Stop Time:  1035     Speech Total Time (min):  15 min    Billable Minutes: Treatment Swallowing Dysfunction 10min and Seld Care/Home Management Training 5min    Radha Molina CF-SLP  04/21/2020

## 2020-04-21 NOTE — PROGRESS NOTES
"S: patient states he is "feeling good" today   Refused PT yesterday because he was not feeling well   States he wants to get out of bed today   Has boost at bedside but has not been drinking it  Did not eat breakfast - says he is not interested in eating it     O:  Temp:  [98.1 °F (36.7 °C)-98.9 °F (37.2 °C)] 98.1 °F (36.7 °C)  Pulse:  [52-72] 69  Resp:  [16-18] 18  SpO2:  [95 %-99 %] 98 %  BP: (110-128)/(60-66) 114/65    PE:  NAD  right lower extremity:  Aquacell dressing in place without visible drainage. Not taken down.   SCDs in place  Ltsi s/s/sp/dp/t  + ehl/fhl/ta/gs  2+ DP     Laboratory:  Most Recent Data:  CBC:   Lab Results   Component Value Date    WBC 7.06 04/21/2020    HGB 8.4 (L) 04/21/2020    HCT 26.9 (L) 04/21/2020     04/21/2020    MCV 83 04/21/2020    RDW 16.5 (H) 04/21/2020     BMP:   Lab Results   Component Value Date     04/21/2020    K 3.9 04/21/2020     04/21/2020    CO2 25 04/21/2020    BUN 56 (H) 04/21/2020    CREATININE 1.3 04/21/2020    GLU 93 04/21/2020    CALCIUM 8.8 04/21/2020       A/P: 79 y.o.male s/p ORIF of R intertroch with SHS on 4/17/20  - Abx: ancef x 24 h postop - complete  - DVT ppx: SCDs, heparin.  Will need  mg BID x 4 weeks on discharge   - PT OT - WBAT RLE with walker  - H/H stable  - Encouraged him to drink boost/ensure.  He was willing to do this and drank some while I was in the room with him  - discussed importance of getting OOB  "

## 2020-04-21 NOTE — PLAN OF CARE
10:50AM[facility] [place] [phone]. TN spoke with patient and his daughter, Odilia:Both in agreement.Dtr to jostin papers.     04/21/20 1113   Post-Acute Status   Post-Acute Authorization Placement   Post-Acute Placement Status Pending Payor Review   Patient choice form signed by patient/caregiver List with quality metrics by geographic area provided   Discharge Delays (!) Patient and Family Barriers   Discharge Plan   Discharge Plan A Skilled Nursing Facility   Discharge Plan B Hospice/home   ..Maddie Ogden, RN, BSN, [facility]  4/21/2020

## 2020-04-21 NOTE — PROGRESS NOTES
Attempted to call report to [facility] at [phone] and no answer.  Will try again in 15 minutes.

## 2020-04-21 NOTE — PLAN OF CARE
Problem: Occupational Therapy Goal  Goal: Occupational Therapy Goal  Description  Goals to be met by: 5/3/20    Patient will increase functional independence with ADLs by performing:    UE Dressing with Stand-by Assistance.  LE Dressing with Contact Guard Assistance.  Grooming while standing at sink with Contact Guard Assistance.  Supine to sit with Stand-by Assistance.  Step transfer with Contact Guard Assistance  Toilet transfer to bedside commode with Contact Guard Assistance.  Upper extremity exercise program x10 reps per handout, with assistance as needed.     Outcome: Ongoing, Progressing   Pt tolerated OT session well today. Pt OOB>chair today with Min A and RW. Pt limited with further mobility/standing 2* dizziness. Vitals stable. Pt progressing and will benefit from continued OT services to address functional deficits.

## 2020-04-21 NOTE — PLAN OF CARE
04/21/20 1640   Medicare Message   Important Message from Medicare regarding Discharge Appeal Rights Given to patient/caregiver;Explained to patient/caregiver;Signed/date by patient/caregiver   Date IMM was signed 04/21/20   Time IMM was signed 1640   Maddie Ogden RN, BSN, STN [facility]  4/21/2020

## 2020-04-21 NOTE — ASSESSMENT & PLAN NOTE
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 Followed by Oncology. Not a candidate for therapy. Home hospice initiated early 2020.   Of note, patient relatively functional at home per daughter and he is still full code. Liver still functioning well based on labs and hip fracture this admission does not appear cancer related. Home hospice is coming 3 days weekly to patient's home where he lives alone and has some family support, but not have 24 hour support available. Return to this may not be safe at this time, though full PT/OT eval still pending. Does not appear that he and family ever agreed to full comfort care at any point in the past. Patient and family will need to further discuss and consider ultimate goals of care, though in the setting of having a surgery for his right hip fracture, I don't think SNF is unreasonable if needed based on my discussions with patient and family.  Hx of prostate cancer documented previously, though I suspect the metastatic disease noted is likely RCC related.

## 2020-04-21 NOTE — ASSESSMENT & PLAN NOTE
Fall at home. CT not suggestive of underlying pathology.   S/P ORIF on 4/17/20.   F/U PT/OT eval. Would be okay for SNF if needed (discussed with patient and daughter).

## 2020-04-21 NOTE — PT/OT/SLP PROGRESS
Physical Therapy Treatment    Patient Name:  Richard Rome   MRN:  [id number]    Recommendations:     Discharge Recommendations:  nursing facility, skilled   Discharge Equipment Recommendations: other (see comments)(TBD)   Barriers to discharge: None    Assessment:     Richard Rome is a 79 y.o. male admitted with a medical diagnosis of Intertrochanteric fracture of right femur.  He presents with the following impairments/functional limitations:  weakness, impaired balance, impaired self care skills, decreased ROM, impaired joint extensibility, impaired endurance, impaired functional mobilty, pain, edema, gait instability, decreased lower extremity function, impaired cardiopulmonary response to activity .    Good participation today. Limited by c/o dizziness in standing (BP not orthostatic). Safe to t/f OOB with RN staff daily    Rehab Prognosis: Good; patient would benefit from acute skilled PT services to address these deficits and reach maximum level of function.    Recent Surgery: Procedure(s) (LRB):  ORIF, HIP, USING DYNAMIC HIP SCREW (Right) 4 Days Post-Op    Plan:     During this hospitalization, patient to be seen daily to address the identified rehab impairments via gait training, therapeutic activities, therapeutic exercises, neuromuscular re-education and progress toward the following goals:    · Plan of Care Expires:  05/03/20    Subjective     Chief Complaint: R hip pain  Patient/Family Comments/goals: agreed to get OOB without encouragement  Pain/Comfort:  · Pain Rating 1: 8/10  · Location - Side 1: Right  · Location 1: hip  · Pain Addressed 1: Nurse notified      Objective:     Communicated with RN prior to session.  Patient found HOB elevated with oxygen, telemetry, bed alarm upon PT entry to room.     General Precautions: Standard, fall   Orthopedic Precautions:RLE weight bearing as tolerated   Braces:       Functional Mobility:  · Bed Mobility:     · Supine to Sit: minimum assistance  · Transfers:      · Sit to Stand:  contact guard assistance with rolling walker  · Gait: training with RW ~ 4 ft with Emeli for RW management, VCs and TCs for sequencing; c/o dizziness, posture became very flexed; guided to sit with Emeli for R knee extension. Recliner in tow for safety  · Balance: fair + standing supported    BP (seated) = 104/61 mmHg,   Seated, immediately after amb, c/o dizziness = 116/57 mmhg      AM-PAC 6 CLICK MOBILITY  Turning over in bed (including adjusting bedclothes, sheets and blankets)?: 3  Sitting down on and standing up from a chair with arms (e.g., wheelchair, bedside commode, etc.): 3  Moving from lying on back to sitting on the side of the bed?: 3  Moving to and from a bed to a chair (including a wheelchair)?: 3  Need to walk in hospital room?: 3  Climbing 3-5 steps with a railing?: 2  Basic Mobility Total Score: 17       Therapeutic Activities and Exercises:   performed AAROM heel slides x 15 reps (supine)    Patient left up in chair with call button in reach and RN notified..    GOALS:   Multidisciplinary Problems     Physical Therapy Goals        Problem: Physical Therapy Goal    Goal Priority Disciplines Outcome Goal Variances Interventions   Physical Therapy Goal     PT, PT/OT Ongoing, Progressing     Description:  Goals to be met by: 5/3/20     Patient will increase functional independence with mobility by performin. Supine to sit with Stand-by Assistance  2. Rolling to Left and Right with Stand-by Assistance.  3. Sit to stand transfer with Stand-by Assistance  4. Gait  x 150 feet with Contact Guard Assistance using Rolling Walker.   5. Sitting at edge of bed x20 minutes with Modified [place]  6. Lower extremity exercise program x15 reps per handout, with independence                      Time Tracking:     PT Received On: 20  PT Start Time: 1100     PT Stop Time: 1127  PT Total Time (min): 27 min     Billable Minutes: Gait Training 10   cotx with OT for safety    Treatment  Type: Treatment  PT/PTA: PT     PTA Visit Number: 0     Andie Moseley, PT  04/21/2020

## 2020-04-21 NOTE — PLAN OF CARE
04/21/20 1723   Final Note   Assessment Type Final Discharge Note   What phone number can be called within the next 1-3 days to see how you are doing after discharge?   (see chart)   Discharge plans and expectations educations in teach back method with documentation complete? Yes   Right Care Referral Info   Referral Type SNF   Facility Name [facility]   Post-Acute Status   Post-Acute Authorization Placement   Post-Acute Placement Status Set-up Complete   Discharge Delays None known at this time

## 2020-04-21 NOTE — PROGRESS NOTES
"[facility] Medicine  Progress Note    Patient Name: Richard Rome  MRN: [id number]  Patient Class: IP- Inpatient   Admission Date: 4/17/2020  Length of Stay: 4 days  Attending Physician: Nikita Pisano MD  Primary Care Provider: Edvin Reyes MD      Subjective:     Principal Problem:Intertrochanteric fracture of right femur      HPI:  Mr Richard Rome is a 79 y.o. man with prostate cancer, CHF, stroke who presents after fall. He lives at home alone in an apartment. He walks with a rollator at baseline but does have issues with balance. He fell on Friday (unclear if he means last Friday or this Friday). Then he fell again today when he was getting out of his chair into his walker. He denies hitting his head, but he said that everything "went black." He denies chest pain, shortness of breath, palpitations, presyncope prior to the fall. He denies biting his tongue and bowel/bladder incontinence. He currently has pain in his right groin that he cannot describe more than "it hurts." He denies pain elsewhere. He says he is also having difficulty urinating and has been trying to urinate for the last 45 minutes.     In ED, found to have acute mildly displaced intertrochanteric fracture of the proximal right femur. Also with Afib on arrival-- unknown if new vs old, elevated troponin without symptoms of ACS, and impaired renal function- unknown if new vs old. 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 He also says he has CHF-- unknown last EF but denies shortness of breath, orthopnea, PND, leg swelling, abdominal distension.     Called daughter Odilia Rome. Daughter says he fell a week ago, 2 days ago. She says he is on hospice and has a hospice nurse that checks on him. He is on hospice for prostate cancer for the last year. " He has not received any treatment for prostate cancer. He has multiple strokes with no residual deficits. He has had multiple heart attacks in the past- last was several years ago. Daughter confirms that he does have a history of Afib but does not know about any kidney dysfunction.     Overview/Hospital Course:  Admitted with R proximal femur intertrochanteric fracture. Also with Afib rate controlled, elevated troponin unknown if ACS vs demand ischemia, and impaired renal function that is likely chronic. Cardiology consulted. High risk for surgery. Ortho consulted. Taken to OR 4/17 for R hip ORIF using dynamic hip screw. Post op, complained of chest pain; EKG no ischemia but is in Afib, troponin similar to prior, unable to take nitrate due to intolerance, and did not improve with dilaudid- unlikely ACS. Pain associated with coughing and improving with mucinex.     Interval History:   Patient conversing pleasantly this am. Wants to get out of hospital. Would be open to rehab if needed. Agrees he will do his best to work with PT today. Eating today. May have had a bm yesterday.    Review of Systems   Respiratory: Negative for cough and shortness of breath.    Cardiovascular: Negative for chest pain.   Gastrointestinal: Negative for abdominal pain, constipation, diarrhea, nausea and vomiting.   Neurological: Negative for numbness.     Objective:     Vital Signs (Most Recent):  Temp: 98.1 °F (36.7 °C) (04/21/20 0734)  Pulse: 69 (04/21/20 0734)  Resp: 18 (04/21/20 0734)  BP: 114/65 (04/21/20 0734)  SpO2: 98 % (04/21/20 0758) Vital Signs (24h Range):  Temp:  [98.1 °F (36.7 °C)-98.9 °F (37.2 °C)] 98.1 °F (36.7 °C)  Pulse:  [52-72] 69  Resp:  [16-18] 18  SpO2:  [95 %-99 %] 98 %  BP: (110-128)/(60-66) 114/65     Weight: 72.1 kg (159 lb)  Body mass index is 21.56 kg/m².    Intake/Output Summary (Last 24 hours) at 4/21/2020 1050  Last data filed at 4/21/2020 0624  Gross per 24 hour   Intake --   Output 1000 ml   Net -1000 ml       Physical Exam   Constitutional: He is oriented to person, place, and time. He appears well-developed and well-nourished. No distress.   Pulmonary/Chest: Effort normal. No respiratory distress.   Abdominal: Soft. He exhibits no distension. There is no tenderness.   Neurological: He is alert and oriented to person, place, and time.   Distal RLE intact, good perfusion.   Psychiatric: He has a normal mood and affect. His behavior is normal.       Significant Labs:   Recent Results (from the past 24 hour(s))   CBC auto differential    Collection Time: 04/21/20  4:30 AM   Result Value Ref Range    WBC 7.06 3.90 - 12.70 K/uL    RBC 3.26 (L) 4.60 - 6.20 M/uL    Hemoglobin 8.4 (L) 14.0 - 18.0 g/dL    Hematocrit 26.9 (L) 40.0 - 54.0 %    Mean Corpuscular Volume 83 82 - 98 fL    Mean Corpuscular Hemoglobin 25.8 (L) 27.0 - 31.0 pg    Mean Corpuscular Hemoglobin Conc 31.2 (L) 32.0 - 36.0 g/dL    RDW 16.5 (H) 11.5 - 14.5 %    Platelets 261 150 - 350 K/uL    MPV 10.4 9.2 - 12.9 fL    Immature Granulocytes 0.3 0.0 - 0.5 %    Gran # (ANC) 4.2 1.8 - 7.7 K/uL    Immature Grans (Abs) 0.02 0.00 - 0.04 K/uL    Lymph # 2.3 1.0 - 4.8 K/uL    Mono # 0.5 0.3 - 1.0 K/uL    Eos # 0.0 0.0 - 0.5 K/uL    Baso # 0.01 0.00 - 0.20 K/uL    nRBC 0 0 /100 WBC    Gran% 59.0 38.0 - 73.0 %    Lymph% 33.0 18.0 - 48.0 %    Mono% 7.5 4.0 - 15.0 %    Eosinophil% 0.1 0.0 - 8.0 %    Basophil% 0.1 0.0 - 1.9 %    Differential Method Automated    Basic metabolic panel    Collection Time: 04/21/20  4:30 AM   Result Value Ref Range    Sodium 140 136 - 145 mmol/L    Potassium 3.9 3.5 - 5.1 mmol/L    Chloride 106 95 - 110 mmol/L    CO2 25 23 - 29 mmol/L    Glucose 93 70 - 110 mg/dL    BUN, Bld 56 (H) 8 - 23 mg/dL    Creatinine 1.3 0.5 - 1.4 mg/dL    Calcium 8.8 8.7 - 10.5 mg/dL    Anion Gap 9 8 - 16 mmol/L    eGFR if African American 60 >60 mL/min/1.73 m^2    eGFR if non African American 52 (A) >60 mL/min/1.73 m^2       Assessment/Plan:      * Intertrochanteric  fracture of right femur  Fall at home. CT not suggestive of underlying pathology.   S/P ORIF on 4/17/20.   F/U PT/OT eval. Would be okay for SNF if needed (discussed with patient and daughter).   Finish 4 weeks ASA bid upon d/c per Ortho reccs.    Papillary renal cell carcinoma  Per documentation, hx of papillary RCC with liver and bone mets. Followed by Oncology. Not a candidate for therapy. Home hospice initiated early 2020.   Of note, patient relatively functional at home per daughter and he is still full code. Liver still functioning well based on labs and hip fracture this admission does not appear cancer related. Home hospice is coming 3 days weekly to patient's home where he lives alone and has some family support, but not have 24 hour support available. Return to this may not be safe at this time, though full PT/OT eval still pending. Does not appear that he and family ever agreed to full comfort care at any point in the past. Patient and family will need to further discuss and consider ultimate goals of care, though in the setting of having a surgery for his right hip fracture, I don't think SNF is unreasonable if needed based on my discussions with patient and family.  Hx of prostate cancer documented previously, though I suspect the metastatic disease noted is likely RCC related.     Urinary retention  Hx of bladder and metastatic RCC with severino need in past.  Acute need for severino consuelo-operatively. On Flomax. Likely keep severino in a day or 2 more then voiding trial.    Renal insufficiency  Resolved.    Elevated troponin  Trop 0.173 on admit. EKG no acute ST-T changes. History of MI and CAD with ischemic cardiomyopathy/CHF (per daughter). Patient denies ACS symptoms prior to fall. Cardiology evaluated. Doubt ACS.    Anemia        Hb 8 post-op, stable.    Coronary artery disease involving native coronary artery of native heart without angina pectoris  History of multiple MI, last was a few years ago per  daughter. Unknown home med list.    Longstanding persistent atrial fibrillation  Per daughter, this is not new. Rate controlled currently without rx.  Poor candidate for anticoagulation given falls at home, but CHADS-VASc is high.     Chronic diastolic congestive heart failure  Stable.        VTE Risk Mitigation (From admission, onward)         Ordered     heparin (porcine) injection 5,000 Units  Every 8 hours      04/17/20 1629     IP VTE HIGH RISK PATIENT  Once      04/17/20 1012     Place sequential compression device  Until discontinued      04/17/20 1012                      Hola Rey MD  Department of Hospital Medicine   [facility]

## 2020-04-21 NOTE — PT/OT/SLP PROGRESS
Occupational Therapy   Treatment    Name: Richard Rome  MRN: [id number]  Admitting Diagnosis:  Intertrochanteric fracture of right femur  4 Days Post-Op    Recommendations:     Discharge Recommendations: nursing facility, skilled  Discharge Equipment Recommendations:  (TBD)  Barriers to discharge:       Assessment:     Richard Rome is a 79 y.o. male with a medical diagnosis of Intertrochanteric fracture of right femur.  He presents with the following performance deficits affecting function: weakness, impaired endurance, impaired self care skills, impaired balance, decreased coordination, decreased upper extremity function, decreased lower extremity function, decreased safety awareness, pain, decreased ROM, edema, orthopedic precautions. Pt tolerated OT session well today. Pt OOB>chair today with Min A and RW. Pt limited with further mobility/standing 2* dizziness. Vitals stable. Pt progressing and will benefit from continued OT services to address functional deficits.     Rehab Prognosis:  Fair; patient would benefit from acute skilled OT services to address these deficits and reach maximum level of function.       Plan:     Patient to be seen 5 x/week, 6 x/week to address the above listed problems via self-care/home management, therapeutic activities, therapeutic exercises  · Plan of Care Expires: 05/03/20  · Plan of Care Reviewed with: patient    Subjective   Pt agreeable to therapy.    Pain/Comfort:  · Pain Rating 1: 8/10  · Location - Side 1: Right  · Location 1: hip  · Pain Addressed 1: Pre-medicate for activity, Reposition, Nurse notified    Objective:     Communicated with: Nurse Hughes prior to session.  Patient found HOB elevated with oxygen, telemetry, bed alarm upon OT entry to room.    General Precautions: Standard, fall   Orthopedic Precautions:RLE weight bearing as tolerated   Braces: N/A     Occupational Performance:     Bed Mobility:  VC's for technique  · Patient completed Rolling/Turning to Right with  contact guard assistance  · Patient completed Supine to Sit with minimum assistance and moderate assistance     Functional Mobility/Transfers:  · Patient completed Sit <> Stand Transfer with minimum assistance  with  rolling walker  from EOB  · Functional Mobility: Pt able to ambulate ~4 ft with MIN A/RW and chair to follow for safety. See PT note for details. Pt did c/o dizziness with ambulation and was assisted to seated position. BP seated /61 and seated immediately after ambulation 116/57. SPO2 on RA 98-99% throughout session.     Activities of Daily Living:  · Grooming: set up assistance to wash face and hands with washcloth seated EOB   · Toileting: severino catheter      [facility] 6 Click ADL: 15    Treatment & Education:  · Pt performed bed mobility, functional transfers, and ADL's as noted above.  · Pt educated on safety with bed mobility and transfers with RLE WBAT  · Pt encouraged to perform BUE AROM shoulder flex/ext, elbow flex/ext, forward punches, fist pumps, and BLE ankle pumps to increase overall strength and endurance needed for functional mobility and ADL tasks.     Patient left up in chair with all lines intact, call button in reach and nurse notifiedEducation:  . Pt instructed to call for nursing assist when ready to get back to bed.    GOALS:   Multidisciplinary Problems     Occupational Therapy Goals        Problem: Occupational Therapy Goal    Goal Priority Disciplines Outcome Interventions   Occupational Therapy Goal     OT, PT/OT Ongoing, Progressing    Description:  Goals to be met by: 5/3/20    Patient will increase functional independence with ADLs by performing:    UE Dressing with Stand-by Assistance.  LE Dressing with Contact Guard Assistance.  Grooming while standing at sink with Contact Guard Assistance.  Supine to sit with Stand-by Assistance.  Step transfer with Contact Guard Assistance  Toilet transfer to bedside commode with Contact Guard Assistance.  Upper extremity exercise  program x10 reps per handout, with assistance as needed.                      Time Tracking:     OT Date of Treatment: 04/21/20  OT Start Time: 1059  OT Stop Time: 1132  OT Total Time (min): 33 min    Billable Minutes:Therapeutic Activity 20 ( co tx twith PT for safety 2* pt not OOB in several days)    KRISHNA Frazier  4/21/2020

## 2020-04-21 NOTE — ASSESSMENT & PLAN NOTE
Trop 0.173 on admit. EKG no acute ST-T changes. History of MI and CAD with ischemic cardiomyopathy/CHF (per daughter). Patient denies ACS symptoms prior to fall.  Cardiology evaluated. Doubt ACS.

## 2020-04-21 NOTE — PROGRESS NOTES
4:59PMThey can call report into the main number and ask for the 1st floor nurses station. He is going to room 122.  TN informed telemetry nurse/Ellen that pt is cleared for discharge from [facility]..Maddie Ogden RN, BSN, STN [facility]  4/21/2020    '

## 2020-04-21 NOTE — PLAN OF CARE
Mina at [facility] requesting narcotic scripts to continue the discharge process to next level of care at the SNF level.  TN sent secure message to Dr. Pisano.     04/21/20 1537   Post-Acute Status   Post-Acute Authorization Placement   Post-Acute Placement Status Authorization Obtained   Discharge Delays (!) Other   Discharge Plan   Discharge Plan A Skilled Nursing Facility   ..Maddie Ogden RN, BSN, [facility]  4/21/2020

## 2020-04-21 NOTE — PLAN OF CARE
[facility]     Department of Hospital Medicine     [address] [place] [address]     [phone] after hours  [phone] fax       Skilled Nursing Facility ORDERS    04/21/2020    Admit to Skilled Nursing Facility:  Skilled Bed                                               Diagnoses:  Active Hospital Problems    Diagnosis  POA    *Intertrochanteric fracture of right femur [S72.141A]  Yes     Priority: 1 - High    Papillary renal cell carcinoma [C64.9]  Unknown     Priority: 2      Hx of RCC with liver and bone mets per documentation. Followed by Oncology. Not a candidate for therapy. Hospice initiated early 2020.      Urinary retention [R33.9]  Yes     Priority: 3     Renal insufficiency [N28.9]  Yes     Priority: 4     Anemia [D64.9]  Yes    Elevated troponin [R79.89]  Yes    Chronic diastolic congestive heart failure [I50.32]  Yes    Longstanding persistent atrial fibrillation [I48.11]  Yes    Coronary artery disease involving native coronary artery of native heart without angina pectoris [I25.10]  Yes      Resolved Hospital Problems    Diagnosis Date Resolved POA    Hyperkalemia [E87.5] 04/19/2020 No       Patient is homebound due to:  Intertrochanteric fracture of right femur.    Allergies:  Review of patient's allergies indicates:   Allergen Reactions    Iodine and iodide containing products Swelling    Nitrate analogues      Diaphoresis and presyncope       Vitals:       Once daily.    Diet:   Dysphagia. Tolerates puree diet with nectar thick liquids and thin liquids via straw.    Acitivities:      - Up in a chair each morning as tolerated   - Ambulate with assistance to bathroom   - Scheduled walks once each shift (every 8 hours)   - Weight bearing: WBAT RLE with walker okay at discharge per Ortho.     Nursing Precautions:    - Aspiration precautions:             -  Upright 90 degrees befor during and after meals       - Fall precautions per  nursing home protocol   - Seizure precaution per long term protocol   - Decubitus precautions:        -  for positioning   - Pressure reducing foam mattress   - Turn patient every two hours. Use wedge pillows to anchor patient    CONSULTS:     Physical Therapy to evaluate and treat     Occupational Therapy to evaluate and treat     Speech Therapy  to evaluate and treat     Nutrition to evaluate and recommend diet    MISCELLANEOUS CARE:       Rogers Care: Empty Rogers bag every shift. Acutely needed post-op. Recommend voiding trial on 4/22/20.              Medications: Discontinue all previous medication orders, if any. See new list below.   Richard Rome   Edna Medication Instructions PATRICIA:[id number]    Printed on:04/21/20 1135   Medication Information                                 brimonidine 0.2% (ALPHAGAN) 0.2 % Drop  Place 1 drop into both eyes twice daily.                         citalopram (CELEXA) 20 MG tablet  Take 20 mg by mouth once daily.             dorzolamide (TRUSOPT) 2 % ophthalmic solution  Place 1 drop into both eyes twice daily.             HYDROcodone-acetaminophen (NORCO) 7.5-325 mg per tablet  Take 1 tablet by mouth every 6 (six) hours as needed for Pain.             LORazepam (ATIVAN) 0.25 MG split tablet  Take 0.25 mg by mouth every 4 (four) hours as needed for Anxiety.             morphine (MS CONTIN) 30 MG 12 hr tablet  Take 30 mg by mouth 2 (two) times daily.             omeprazole (PRILOSEC) 40 MG capsule  Take 40 mg by mouth once daily.             oxyCODONE (ROXICODONE) 15 MG Tab  Take 15 mg by mouth every 4 (four) hours as needed for Pain.             senna-docusate 8.6-50 mg (SENNA PLUS) 8.6-50 mg per tablet  Take 1 tablet by mouth daily as needed for Constipation.             tamsulosin (FLOMAX) 0.4 mg Cp24  TK ONE C PO  QHS FOR PROSTATE             temazepam (RESTORIL) 15 mg Cap  Take 15 mg by mouth nightly as needed.                        _________________________________  Hola Rey MD  04/21/2020

## 2020-04-21 NOTE — ASSESSMENT & PLAN NOTE
Hx of bladder and metastatic RCC with severino need in past.  Acute need for severino consuelo-operatively. On Flomax. Likely keep severino in a day or 2 more then voiding trial.

## 2020-04-22 NOTE — ASSESSMENT & PLAN NOTE
Hx of bladder and metastatic RCC with severino need in past.  Acute need for severino consuelo-operatively. On Flomax. Likely keep severino in a day or 2 more then voiding trial. This can be performed at [facility] on 4/22/20.

## 2020-04-22 NOTE — PLAN OF CARE
Problem: Adult Inpatient Plan of Care  Goal: Plan of Care Review  Outcome: Met     Problem: Fall Injury Risk  Goal: Absence of Fall and Fall-Related Injury  Outcome: Met     Problem: Skin Injury Risk Increased  Goal: Skin Health and Integrity  Outcome: Met     Problem: Infection  Goal: Infection Symptom Resolution  Outcome: Met     Problem: Pain Acute  Goal: Optimal Pain Control  Outcome: Met

## 2020-04-22 NOTE — DISCHARGE SUMMARY
"[facility] Medicine  Discharge Summary      Patient Name: Richard Rome  MRN: [id number]  Admission Date: 4/17/2020  Hospital Length of Stay: 4 days  Discharge Date and Time:  04/22/2020 3:34 PM  Attending Physician: No att. providers found   Discharging Provider: Hola Rey MD  Primary Care Provider: Edvin Reyes MD      HPI:   Mr Richard Rome is a 79 y.o. man with prostate cancer, CHF, stroke who presents after fall. He lives at home alone in an apartment. He walks with a rollator at baseline but does have issues with balance. He fell on Friday (unclear if he means last Friday or this Friday). Then he fell again today when he was getting out of his chair into his walker. He denies hitting his head, but he said that everything "went black." He denies chest pain, shortness of breath, palpitations, presyncope prior to the fall. He denies biting his tongue and bowel/bladder incontinence. He currently has pain in his right groin that he cannot describe more than "it hurts." He denies pain elsewhere. He says he is also having difficulty urinating and has been trying to urinate for the last 45 minutes.     In ED, found to have acute mildly displaced intertrochanteric fracture of the proximal right femur. Also with Afib on arrival-- unknown if new vs old, elevated troponin without symptoms of ACS, and impaired renal function- unknown if new vs old. 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 He also says he has CHF-- unknown last EF but denies shortness of breath, orthopnea, PND, leg swelling, abdominal distension.   Called daughter Odilia Rome. Daughter says he fell a week ago, 2 days ago. She says he is on hospice and has a hospice nurse that checks on him. He is on hospice for prostate cancer for the last year. He has " not received any treatment for prostate cancer. He has multiple strokes with no residual deficits. He has had multiple heart attacks in the past- last was several years ago. Daughter confirms that he does have a history of Afib but does not know about any kidney dysfunction.     Procedure(s) (LRB):  ORIF, HIP, USING DYNAMIC HIP SCREW (Right)      Hospital Course:   Admitted with R proximal femur intertrochanteric fracture. Ortho consulted and performed ORIF on 4/17.  Also with Afib rate controlled. Elevated troponin. Cardiology consulted. Post op, complained of chest pain; EKG no ischemia but is in Afib, troponin similar. Unlikely ACS. Pain associated with coughing and improving with mucinex.      Consults:   Consults (From admission, onward)        Status Ordering Provider     Inpatient consult to Orthopedic Surgery  Once     Provider:  Janet Amaro MD    Completed MARK NUÑEZ          * Intertrochanteric fracture of right femur  Fall at home. CT not suggestive of underlying pathology.   S/P ORIF on 4/17/20.  mg BID upon d/c per Ortho reccs.  Transferred to SNF for rehab on 4/21/20.    Papillary renal cell carcinoma  Per documentation, hx of papillary RCC with liver and bone mets. Followed by Oncology. Not a candidate for therapy. Home hospice initiated early 2020.   Of note, patient relatively functional at home per daughter and he is still full code. Liver still functioning well based on labs and hip fracture this admission does not appear cancer related. Home hospice is coming 3 days weekly to patient's home where he lives alone and has some family support, but not have 24 hour support available. Return to this not safe at this time. Does not appear that he and family ever agreed to full comfort care at any point in the past. Patient and family will need to further discuss and consider ultimate goals of care, though in the setting of having a surgery for his right hip fracture, SNF reasonable  at this point in time. I personally discussed with his daughter, Odilia.  Hx of prostate cancer documented previously, though I suspect the metastatic disease noted is likely RCC related.     Urinary retention  Hx of bladder and metastatic RCC with severino need in past.  Acute need for severino consuelo-operatively. On Flomax. Likely keep severino in a day or 2 more then voiding trial. This can be performed at accepting SNF on 4/22/20.    Renal insufficiency  Resolved.    Coronary artery disease involving native coronary artery of native heart without angina pectoris  History of multiple MI, last was a few years ago per daughter. Unknown home med list.    Longstanding persistent atrial fibrillation  Per daughter, this is not new. Rate controlled currently without rx.  Poor candidate for anticoagulation given falls at home, but CHADS-VASc is high.     Chronic diastolic congestive heart failure  Stable.    Elevated troponin  Trop 0.173 on admit. EKG no acute ST-T changes. History of MI and CAD with ischemic cardiomyopathy/CHF (per daughter). Patient denies ACS symptoms prior to fall.  Cardiology evaluated. Doubt ACS.    Anemia  Hb 8 post-op, stable.    Hyperkalemia-resolved as of 4/19/2020      Final Active Diagnoses:    Diagnosis Date Noted POA    PRINCIPAL PROBLEM:  Intertrochanteric fracture of right femur [S72.141A] 04/17/2020 Yes    Papillary renal cell carcinoma [C64.9] 12/22/2017 Unknown    Urinary retention [R33.9] 04/19/2020 Yes    Renal insufficiency [N28.9] 04/17/2020 Yes    Anemia [D64.9] 04/17/2020 Yes    Elevated troponin [R79.89] 04/17/2020 Yes    Chronic diastolic congestive heart failure [I50.32] 04/17/2020 Yes    Longstanding persistent atrial fibrillation [I48.11] 04/17/2020 Yes    Coronary artery disease involving native coronary artery of native heart without angina pectoris [I25.10] 04/17/2020 Yes      Problems Resolved During this Admission:    Diagnosis Date Noted Date Resolved POA     Hyperkalemia [E87.5] 04/18/2020 04/19/2020 No       Discharged Condition: stable    Disposition: Skilled Nursing Facility    Follow Up:  Follow-up Information     [facility].    Specialties:  Nursing Home Agency, SNF Agency  Why:  SNF  Contact information:  [address]  [place] [address]  [phone]                 Patient Instructions:      Diet Adult Regular   Order Comments: Dysphagia. Tolerating pureed, nectar thick and thin liquids via straw prior to d/c. Speech therapy to assess at accepting facility if available.     Vital signs per facility protocol     Intake and output per facility protocol     Skin assessment every shift      Notify Physician     Order Specific Question Answer Comments   Temperature (F) greater than 101    Systolic Blood Pressure SBP greater than or equal to 160    Systolic Blood Pressure SBP less than or equal to 90    Diastolic Blood Pressure DBP greater than or equal to 110    Diastolic Blood Pressure DBP less than or equal to 60    Pulse greater than or equal to 120    Pulse less than or equal to 50    Respirations Rate RR greater than or equal to 30    Respirations Rate RR less than or equal to 6    Urine output less than 60 over 2 hours   SPO2% less than 90      Full code     Pulse Oximetry       Significant Diagnostic Studies:   Recent Results (from the past 168 hour(s))   Comprehensive metabolic panel    Collection Time: 04/17/20  6:25 AM   Result Value Ref Range    Sodium 138 136 - 145 mmol/L    Potassium 4.2 3.5 - 5.1 mmol/L    Chloride 102 95 - 110 mmol/L    CO2 28 23 - 29 mmol/L    Glucose 108 70 - 110 mg/dL    BUN, Bld 70 (H) 8 - 23 mg/dL    Creatinine 1.8 (H) 0.5 - 1.4 mg/dL    Calcium 9.4 8.7 - 10.5 mg/dL    Total Protein 8.1 6.0 - 8.4 g/dL    Albumin 3.7 3.5 - 5.2 g/dL    Total Bilirubin 0.6 0.1 - 1.0 mg/dL    Alkaline Phosphatase 207 (H) 55 - 135 U/L    AST 36 10 - 40 U/L    ALT 22 10 - 44 U/L    Anion Gap 8 8 - 16 mmol/L    eGFR if African  American 40 (A) >60 mL/min/1.73 m^2    eGFR if non African American 35 (A) >60 mL/min/1.73 m^2   APTT    Collection Time: 04/17/20  6:25 AM   Result Value Ref Range    aPTT 27.1 21.0 - 32.0 sec   Protime-INR    Collection Time: 04/17/20  6:25 AM   Result Value Ref Range    Prothrombin Time 12.2 9.0 - 12.5 sec    INR 1.1 0.8 - 1.2   CBC auto differential    Collection Time: 04/17/20  6:25 AM   Result Value Ref Range    WBC 6.83 3.90 - 12.70 K/uL    RBC 3.63 (L) 4.60 - 6.20 M/uL    Hemoglobin 9.1 (L) 14.0 - 18.0 g/dL    Hematocrit 29.9 (L) 40.0 - 54.0 %    Mean Corpuscular Volume 82 82 - 98 fL    Mean Corpuscular Hemoglobin 25.1 (L) 27.0 - 31.0 pg    Mean Corpuscular Hemoglobin Conc 30.4 (L) 32.0 - 36.0 g/dL    RDW 15.9 (H) 11.5 - 14.5 %    Platelets 254 150 - 350 K/uL    MPV 10.2 9.2 - 12.9 fL    Immature Granulocytes 0.6 (H) 0.0 - 0.5 %    Gran # (ANC) 4.6 1.8 - 7.7 K/uL    Immature Grans (Abs) 0.04 0.00 - 0.04 K/uL    Lymph # 1.8 1.0 - 4.8 K/uL    Mono # 0.4 0.3 - 1.0 K/uL    Eos # 0.0 0.0 - 0.5 K/uL    Baso # 0.02 0.00 - 0.20 K/uL    nRBC 0 0 /100 WBC    Gran% 67.3 38.0 - 73.0 %    Lymph% 25.8 18.0 - 48.0 %    Mono% 5.4 4.0 - 15.0 %    Eosinophil% 0.6 0.0 - 8.0 %    Basophil% 0.3 0.0 - 1.9 %    Differential Method Automated    Troponin I    Collection Time: 04/17/20  6:25 AM   Result Value Ref Range    Troponin I 0.173 (H) 0.000 - 0.026 ng/mL   Type & Screen    Collection Time: 04/17/20  7:48 AM   Result Value Ref Range    Group & Rh O POS     Indirect Deb NEG    COVID-19 Routine Screening    Collection Time: 04/17/20  8:46 AM   Result Value Ref Range    SARS-CoV-2 RNA, Amplification, Qual Negative Negative   Echo Color Flow Doppler? Yes    Collection Time: 04/17/20 12:52 PM   Result Value Ref Range    TDI SEPTAL 0.06 m/s    LV LATERAL E/E' RATIO 8.83 m/s    LV SEPTAL E/E' RATIO 17.67 m/s    LA WIDTH 4.77 cm    AORTIC VALVE CUSP SEPERATION 2.58 cm    TDI LATERAL 0.12 m/s    PV PEAK VELOCITY 1.17 cm/s    LVIDD  4.55 3.5 - 6.0 cm    IVS 1.65 (A) 0.6 - 1.1 cm    PW 1.59 (A) 0.6 - 1.1 cm    Ao root annulus 4.06 cm    LVIDS 3.23 2.1 - 4.0 cm    FS 29 28 - 44 %    LA volume 88.04 cm3    Sinus 3.74 cm    STJ 3.26 cm    Ascending aorta 3.91 cm    LV mass 315.54 g    LA size 3.63 cm    RVDD 4.07 cm    TAPSE 1.65 cm    RV S' 14.15 cm/s    Left Ventricle Relative Wall Thickness 0.70 cm    AV mean gradient 5 mmHg    AV valve area 4.31 cm2    AV Velocity Ratio 0.70     AV index (prosthetic) 0.70     E/A ratio 3.42     Mean e' 0.09 m/s    E wave decelartion time 252.90 msec    IVRT 161.48 msec    Pulm vein S/D ratio 0.43     LVOT diameter 2.81 cm    LVOT area 6.2 cm2    LVOT peak lalo 0.96 m/s    LVOT peak VTI 23.29 cm    Ao peak lalo 1.38 m/s    Ao VTI 33.47 cm    LVOT stroke volume 144.36 cm3    AV peak gradient 8 mmHg    E/E' ratio 11.78 m/s    MV Peak E Lalo 1.06 m/s    TR Max Lalo 3.34 m/s    MV Peak A Lalo 0.31 m/s    PV Peak S Lalo 0.20 m/s    PV Peak D Lalo 0.47 m/s    LV Systolic Volume 41.90 mL    LV Systolic Volume Index 21.7 mL/m2    LV Diastolic Volume 94.98 mL    LV Diastolic Volume Index 49.15 mL/m2    LA Volume Index 45.6 mL/m2    LV Mass Index 163 g/m2    RA Major Axis 6.30 cm    Left Atrium Minor Axis 5.60 cm    Left Atrium Major Axis 6.42 cm    Triscuspid Valve Regurgitation Peak Gradient 45 mmHg    RA Width 4.68 cm    BSA 1.91 m2    Right Atrial Pressure (from IVC) 15 mmHg    TV rest pulmonary artery pressure 60 mmHg   Troponin I    Collection Time: 04/17/20  1:31 PM   Result Value Ref Range    Troponin I 0.122 (H) 0.000 - 0.026 ng/mL   Hemoglobin    Collection Time: 04/17/20  9:28 PM   Result Value Ref Range    Hemoglobin 8.7 (L) 14.0 - 18.0 g/dL   Hematocrit    Collection Time: 04/17/20  9:28 PM   Result Value Ref Range    Hematocrit 28.6 (L) 40.0 - 54.0 %   CBC auto differential    Collection Time: 04/18/20  6:08 AM   Result Value Ref Range    WBC 6.14 3.90 - 12.70 K/uL    RBC 3.40 (L) 4.60 - 6.20 M/uL    Hemoglobin 8.6  (L) 14.0 - 18.0 g/dL    Hematocrit 29.9 (L) 40.0 - 54.0 %    Mean Corpuscular Volume 88 82 - 98 fL    Mean Corpuscular Hemoglobin 25.3 (L) 27.0 - 31.0 pg    Mean Corpuscular Hemoglobin Conc 28.8 (L) 32.0 - 36.0 g/dL    RDW 16.9 (H) 11.5 - 14.5 %    Platelets SEE COMMENT 150 - 350 K/uL    MPV SEE COMMENT 9.2 - 12.9 fL    Immature Granulocytes 0.7 (H) 0.0 - 0.5 %    Gran # (ANC) 4.2 1.8 - 7.7 K/uL    Immature Grans (Abs) 0.04 0.00 - 0.04 K/uL    Lymph # 1.2 1.0 - 4.8 K/uL    Mono # 0.6 0.3 - 1.0 K/uL    Eos # 0.1 0.0 - 0.5 K/uL    Baso # 0.01 0.00 - 0.20 K/uL    nRBC 0 0 /100 WBC    Gran% 68.7 38.0 - 73.0 %    Lymph% 19.1 18.0 - 48.0 %    Mono% 9.0 4.0 - 15.0 %    Eosinophil% 2.3 0.0 - 8.0 %    Basophil% 0.2 0.0 - 1.9 %    Differential Method Automated    Basic metabolic panel    Collection Time: 04/18/20  6:08 AM   Result Value Ref Range    Sodium 139 136 - 145 mmol/L    Potassium 5.8 (H) 3.5 - 5.1 mmol/L    Chloride 107 95 - 110 mmol/L    CO2 22 (L) 23 - 29 mmol/L    Glucose 112 (H) 70 - 110 mg/dL    BUN, Bld 64 (H) 8 - 23 mg/dL    Creatinine 1.6 (H) 0.5 - 1.4 mg/dL    Calcium 8.9 8.7 - 10.5 mg/dL    Anion Gap 10 8 - 16 mmol/L    eGFR if African American 47 (A) >60 mL/min/1.73 m^2    eGFR if non African American 40 (A) >60 mL/min/1.73 m^2   Troponin I    Collection Time: 04/18/20  6:08 AM   Result Value Ref Range    Troponin I 0.159 (H) 0.000 - 0.026 ng/mL   Basic metabolic panel    Collection Time: 04/18/20  2:50 PM   Result Value Ref Range    Sodium 138 136 - 145 mmol/L    Potassium 4.7 3.5 - 5.1 mmol/L    Chloride 105 95 - 110 mmol/L    CO2 24 23 - 29 mmol/L    Glucose 116 (H) 70 - 110 mg/dL    BUN, Bld 63 (H) 8 - 23 mg/dL    Creatinine 1.5 (H) 0.5 - 1.4 mg/dL    Calcium 8.7 8.7 - 10.5 mg/dL    Anion Gap 9 8 - 16 mmol/L    eGFR if African American 50 (A) >60 mL/min/1.73 m^2    eGFR if non African American 44 (A) >60 mL/min/1.73 m^2   Troponin I    Collection Time: 04/18/20 11:01 PM   Result Value Ref Range     Troponin I 0.172 (H) 0.000 - 0.026 ng/mL   CBC auto differential    Collection Time: 04/19/20  4:36 AM   Result Value Ref Range    WBC 6.57 3.90 - 12.70 K/uL    RBC 3.22 (L) 4.60 - 6.20 M/uL    Hemoglobin 8.0 (L) 14.0 - 18.0 g/dL    Hematocrit 26.5 (L) 40.0 - 54.0 %    Mean Corpuscular Volume 82 82 - 98 fL    Mean Corpuscular Hemoglobin 24.8 (L) 27.0 - 31.0 pg    Mean Corpuscular Hemoglobin Conc 30.2 (L) 32.0 - 36.0 g/dL    RDW 16.3 (H) 11.5 - 14.5 %    Platelets 219 150 - 350 K/uL    MPV 10.5 9.2 - 12.9 fL    Immature Granulocytes 0.3 0.0 - 0.5 %    Gran # (ANC) 4.3 1.8 - 7.7 K/uL    Immature Grans (Abs) 0.02 0.00 - 0.04 K/uL    Lymph # 1.6 1.0 - 4.8 K/uL    Mono # 0.7 0.3 - 1.0 K/uL    Eos # 0.0 0.0 - 0.5 K/uL    Baso # 0.00 0.00 - 0.20 K/uL    nRBC 0 0 /100 WBC    Gran% 64.9 38.0 - 73.0 %    Lymph% 24.4 18.0 - 48.0 %    Mono% 10.2 4.0 - 15.0 %    Eosinophil% 0.2 0.0 - 8.0 %    Basophil% 0.0 0.0 - 1.9 %    Differential Method Automated    Basic metabolic panel    Collection Time: 04/19/20  4:36 AM   Result Value Ref Range    Sodium 138 136 - 145 mmol/L    Potassium 4.7 3.5 - 5.1 mmol/L    Chloride 106 95 - 110 mmol/L    CO2 24 23 - 29 mmol/L    Glucose 100 70 - 110 mg/dL    BUN, Bld 61 (H) 8 - 23 mg/dL    Creatinine 1.5 (H) 0.5 - 1.4 mg/dL    Calcium 8.7 8.7 - 10.5 mg/dL    Anion Gap 8 8 - 16 mmol/L    eGFR if African American 50 (A) >60 mL/min/1.73 m^2    eGFR if non African American 44 (A) >60 mL/min/1.73 m^2   CBC auto differential    Collection Time: 04/20/20  6:15 AM   Result Value Ref Range    WBC 7.76 3.90 - 12.70 K/uL    RBC 3.18 (L) 4.60 - 6.20 M/uL    Hemoglobin 8.0 (L) 14.0 - 18.0 g/dL    Hematocrit 25.7 (L) 40.0 - 54.0 %    Mean Corpuscular Volume 81 (L) 82 - 98 fL    Mean Corpuscular Hemoglobin 25.2 (L) 27.0 - 31.0 pg    Mean Corpuscular Hemoglobin Conc 31.1 (L) 32.0 - 36.0 g/dL    RDW 15.9 (H) 11.5 - 14.5 %    Platelets 232 150 - 350 K/uL    MPV 9.8 9.2 - 12.9 fL    Immature Granulocytes 0.3 0.0 -  0.5 %    Gran # (ANC) 4.9 1.8 - 7.7 K/uL    Immature Grans (Abs) 0.02 0.00 - 0.04 K/uL    Lymph # 2.2 1.0 - 4.8 K/uL    Mono # 0.6 0.3 - 1.0 K/uL    Eos # 0.0 0.0 - 0.5 K/uL    Baso # 0.01 0.00 - 0.20 K/uL    nRBC 0 0 /100 WBC    Gran% 63.2 38.0 - 73.0 %    Lymph% 28.4 18.0 - 48.0 %    Mono% 7.9 4.0 - 15.0 %    Eosinophil% 0.1 0.0 - 8.0 %    Basophil% 0.1 0.0 - 1.9 %    Differential Method Automated    Basic metabolic panel    Collection Time: 04/20/20  6:15 AM   Result Value Ref Range    Sodium 138 136 - 145 mmol/L    Potassium 4.0 3.5 - 5.1 mmol/L    Chloride 105 95 - 110 mmol/L    CO2 26 23 - 29 mmol/L    Glucose 101 70 - 110 mg/dL    BUN, Bld 63 (H) 8 - 23 mg/dL    Creatinine 1.4 0.5 - 1.4 mg/dL    Calcium 8.6 (L) 8.7 - 10.5 mg/dL    Anion Gap 7 (L) 8 - 16 mmol/L    eGFR if African American 55 (A) >60 mL/min/1.73 m^2    eGFR if non African American 47 (A) >60 mL/min/1.73 m^2   CBC auto differential    Collection Time: 04/21/20  4:30 AM   Result Value Ref Range    WBC 7.06 3.90 - 12.70 K/uL    RBC 3.26 (L) 4.60 - 6.20 M/uL    Hemoglobin 8.4 (L) 14.0 - 18.0 g/dL    Hematocrit 26.9 (L) 40.0 - 54.0 %    Mean Corpuscular Volume 83 82 - 98 fL    Mean Corpuscular Hemoglobin 25.8 (L) 27.0 - 31.0 pg    Mean Corpuscular Hemoglobin Conc 31.2 (L) 32.0 - 36.0 g/dL    RDW 16.5 (H) 11.5 - 14.5 %    Platelets 261 150 - 350 K/uL    MPV 10.4 9.2 - 12.9 fL    Immature Granulocytes 0.3 0.0 - 0.5 %    Gran # (ANC) 4.2 1.8 - 7.7 K/uL    Immature Grans (Abs) 0.02 0.00 - 0.04 K/uL    Lymph # 2.3 1.0 - 4.8 K/uL    Mono # 0.5 0.3 - 1.0 K/uL    Eos # 0.0 0.0 - 0.5 K/uL    Baso # 0.01 0.00 - 0.20 K/uL    nRBC 0 0 /100 WBC    Gran% 59.0 38.0 - 73.0 %    Lymph% 33.0 18.0 - 48.0 %    Mono% 7.5 4.0 - 15.0 %    Eosinophil% 0.1 0.0 - 8.0 %    Basophil% 0.1 0.0 - 1.9 %    Differential Method Automated    Basic metabolic panel    Collection Time: 04/21/20  4:30 AM   Result Value Ref Range    Sodium 140 136 - 145 mmol/L    Potassium 3.9 3.5 - 5.1  mmol/L    Chloride 106 95 - 110 mmol/L    CO2 25 23 - 29 mmol/L    Glucose 93 70 - 110 mg/dL    BUN, Bld 56 (H) 8 - 23 mg/dL    Creatinine 1.3 0.5 - 1.4 mg/dL    Calcium 8.8 8.7 - 10.5 mg/dL    Anion Gap 9 8 - 16 mmol/L    eGFR if African American 60 >60 mL/min/1.73 m^2    eGFR if non African American 52 (A) >60 mL/min/1.73 m^2     Imaging Results           CT Hip Without Contrast Right (Final result)  Result time 04/17/20 11:14:24    Final result by Aayush Colón MD (04/17/20 11:14:24)                 Impression:      Partly comminuted right femoral intertrochanteric fracture.  No clearly evident underlying lesion to suggest pathologic component by CT.    Patchy sclerosis of the right femoral head as can be seen with changes of AVN.  No subchondral femoral head collapse.    Right inguinal adenopathy, possibly metastatic in this patient with known history of malignancy.    This report was flagged in Epic as containing an incidental finding.      Electronically signed by: Aayush Colón  Date:    04/17/2020  Time:    11:14             Narrative:    EXAMINATION:  CT HIP WITHOUT CONTRAST RIGHT    CLINICAL HISTORY:  Fracture, hip;  Displaced intertrochanteric fracture of right femur, initial encounter for closed fracture    TECHNIQUE:  1.25 mm    COMPARISON:  Right femur radiograph dated 04/17/2020    FINDINGS:  There is partly comminuted right intertrochanteric fracture.  No convincing CT evidence for underlying pathologic component.  Right femoral head is well seated noting some degenerative joint space narrowing.  Preservation of the femoral head contour with patchy subcortical sclerosis.  Partially visualized femoral stem from knee arthroplasty.  Mineralization change of the proximal right hamstring attachment.  Right inguinal adenopathy including 2.2 cm node (series 2, image 270).                               X-Ray Femur 2 View Right (Final result)  Result time 04/17/20 09:21:31    Final result by Bev CARO  MD Ladi (04/17/20 09:21:31)                 Impression:      Acute mildly displaced intertrochanteric fracture of the proximal right femur and surgical changes in the distal femur and proximal tibia.      Electronically signed by: Bev Bledsoe MD  Date:    04/17/2020  Time:    09:21             Narrative:    EXAMINATION:  XR FEMUR 2 VIEW RIGHT    CLINICAL HISTORY:  hip fracture with long stem TKA seen on initial hip films;Displaced intertrochanteric fracture of right femur, initial encounter for closed fracture    TECHNIQUE:  AP and lateral views of the right femur were performed.    COMPARISON:  Hip radiographs April 17, 2020 at 07:21    FINDINGS:  As previously, there is a mildly displaced intertrochanteric fracture of the proximal right femur.  Alignment of the right femoral head with respect to the acetabulum.  Appears to be maintained.  There is an intramedullary fixation nail spanning the right femur with some heterotopic ossification and a partially visualized knee prosthesis.  No additional fractures of the femur are seen.                               X-Ray Hip 2 View Right (Final result)  Result time 04/17/20 07:41:12    Final result by Bassem Mendoza MD (04/17/20 07:41:12)                 Impression:      Limited exam secondary to patient rotation.  Acute mildly displaced intertrochanteric fracture of the proximal right femur.      Electronically signed by: Bassem Mendoza MD  Date:    04/17/2020  Time:    07:41             Narrative:    EXAMINATION:  XR HIP 2 VIEW RIGHT    CLINICAL HISTORY:  Pain in right hip    TECHNIQUE:  AP view of the pelvis and frog leg lateral view of the right hip were performed.    COMPARISON:  None    FINDINGS:  The patient is rotated limiting assessment.  There is a mildly displaced intertrochanteric fracture of the proximal right femur.  Right femoral head appears to remain appropriately seated within the acetabulum.  There is a partially visualized retrograde  intramedullary fixation nail spanning the right femur.  The sacrum and bony pelvis is partially obscured by overlying bowel gas.                                X-Ray Chest 1 View (Final result)  Result time 04/17/20 07:44:03    Final result by Bassem Mendoza MD (04/17/20 07:44:03)                 Impression:      1.  Mild coarse accentuated interstitial lung markings without evidence of confluent airspace consolidation or pneumothorax.    2.  1.5 cm nodular opacity projecting over the right superior hilar region.  This may represent a prominent pulmonary vessel, although recommend nonemergent chest CT follow-up to exclude underlying pulmonary nodule.  Alternatively, correlation with any prior outside imaging for stability is advised.    This report was flagged in Epic as containing an incidental finding.      Electronically signed by: Bassem Mendoza MD  Date:    04/17/2020  Time:    07:44             Narrative:    EXAMINATION:  XR CHEST 1 VIEW    CLINICAL HISTORY:  Encounter for other preprocedural examination    TECHNIQUE:  Single frontal view of the chest was performed.    COMPARISON:  None    FINDINGS:  Cardiac monitoring leads overlie the chest.  Cardiomediastinal silhouette is mildly enlarged.  There are mildly accentuated coarse interstitial lung markings bilaterally.  There is a 1.5 cm nodular opacity projecting over the right superior hilar region.  The remaining lungs demonstrate no confluent airspace consolidation or pleural effusion.  There is no pneumothorax.  Osseous structures demonstrate degenerative changes.                              Pending Diagnostic Studies:     None         Medications:  Reconciled Home Medications:      Medication List      START taking these medications    aspirin 325 MG tablet  Take 1 tablet (325 mg total) by mouth 2 (two) times daily.     HYDROcodone-acetaminophen 7.5-325 mg per tablet  Commonly known as:  NORCO  Take 1 tablet by mouth every 6 (six) hours as needed  for Pain.        CHANGE how you take these medications    LORazepam 0.5 MG tablet  Commonly known as:  ATIVAN  Take 0.5 tablets (0.25 mg total) by mouth every 6 (six) hours as needed for Anxiety.  What changed:    · medication strength  · when to take this        CONTINUE taking these medications    brimonidine 0.2% 0.2 % Drop  Commonly known as:  ALPHAGAN  Place 1 drop into both eyes once daily.     citalopram 20 MG tablet  Commonly known as:  CELEXA  Take 20 mg by mouth once daily.     dorzolamide 2 % ophthalmic solution  Commonly known as:  TRUSOPT  Place 1 drop into both eyes once daily.     DULCOLAX (BISACODYL) 10 mg Supp  Generic drug:  bisacodyL  Place 10 mg rectally once daily.     omeprazole 40 MG capsule  Commonly known as:  PRILOSEC  Take 40 mg by mouth once daily.     SENNA PLUS 8.6-50 mg per tablet  Generic drug:  senna-docusate 8.6-50 mg  Take 1 tablet by mouth daily as needed for Constipation.     tamsulosin 0.4 mg Cap  Commonly known as:  FLOMAX  TK ONE C PO  QHS FOR PROSTATE        STOP taking these medications    bumetanide 1 MG tablet  Commonly known as:  BUMEX     morphine 30 MG 12 hr tablet  Commonly known as:  MS CONTIN     oxyCODONE 15 MG Tab  Commonly known as:  ROXICODONE     temazepam 15 mg Cap  Commonly known as:  RESTORIL            Indwelling Lines/Drains at time of discharge:   Lines/Drains/Airways     None                 Time spent on the discharge of patient: 35 minutes  Patient was seen and examined on the date of discharge and determined to be suitable for discharge.         Hola Rey MD  Department of Hospital Medicine  [facility]

## 2020-04-22 NOTE — NURSING
Patient d/c to [facility] with [facility] via wheelchair X1 attendant. IV and tele removed. All belongings sent with patient. Patient stable at this time.

## 2020-04-22 NOTE — ASSESSMENT & PLAN NOTE
Fall at home. CT not suggestive of underlying pathology.   S/P ORIF on 4/17/20.   Transferred to SNF for rehab on 4/21/20.

## 2020-04-22 NOTE — ASSESSMENT & PLAN NOTE
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 Followed by Oncology. Not a candidate for therapy. Home hospice initiated early 2020.   Of note, patient relatively functional at home per daughter and he is still full code. Liver still functioning well based on labs and hip fracture this admission does not appear cancer related. Home hospice is coming 3 days weekly to patient's home where he lives alone and has some family support, but not have 24 hour support available. Return to this not safe at this time. Does not appear that he and family ever agreed to full comfort care at any point in the past. Patient and family will need to further discuss and consider ultimate goals of care, though in the setting of having a surgery for his right hip fracture, SNF reasonable at this point in time. I personally discussed with his daughter, Odilia.  Hx of prostate cancer documented previously, though I suspect the metastatic disease noted is likely RCC related.

## 2020-05-04 ENCOUNTER — OFFICE VISIT (OUTPATIENT)
Dept: ORTHOPEDICS | Facility: CLINIC | Age: 80
End: 2020-05-04
Payer: MEDICARE

## 2020-05-04 VITALS
SYSTOLIC BLOOD PRESSURE: 118 MMHG | BODY MASS INDEX: 21.53 KG/M2 | OXYGEN SATURATION: 99 % | HEIGHT: 72 IN | RESPIRATION RATE: 18 BRPM | DIASTOLIC BLOOD PRESSURE: 60 MMHG | HEART RATE: 82 BPM | WEIGHT: 158.94 LBS

## 2020-05-04 DIAGNOSIS — S72.141A CLOSED DISPLACED INTERTROCHANTERIC FRACTURE OF RIGHT FEMUR, INITIAL ENCOUNTER: Primary | ICD-10-CM

## 2020-05-04 PROCEDURE — 99024 PR POST-OP FOLLOW-UP VISIT: ICD-10-PCS | Mod: S$GLB,,, | Performed by: ORTHOPAEDIC SURGERY

## 2020-05-04 PROCEDURE — 99024 POSTOP FOLLOW-UP VISIT: CPT | Mod: S$GLB,,, | Performed by: ORTHOPAEDIC SURGERY

## 2020-05-04 PROCEDURE — 99999 PR PBB SHADOW E&M-EST. PATIENT-LVL IV: CPT | Mod: PBBFAC,,, | Performed by: ORTHOPAEDIC SURGERY

## 2020-05-04 PROCEDURE — 99999 PR PBB SHADOW E&M-EST. PATIENT-LVL IV: ICD-10-PCS | Mod: PBBFAC,,, | Performed by: ORTHOPAEDIC SURGERY

## 2020-05-04 NOTE — PROGRESS NOTES
Postoperative Visit    History of Present Illness:   Richard is about 2 weeks s/p right SHS for intertrochanteric fracture (DOS-20)  Was discharged from the hospital to [facility]   Denies any pain in hip or groin -  states he has been walking with PT and on his own without pain  C/o pain to knee    No issues with incision   Denies falls    Daughter was called to discuss plan after his visit - she reports that she has not been able to see her father since discharge but he is expected to leave the nursing home Wednesday.  He is still under hospice care.   She reports he told her passed out and fell sometime in the last week or so. He has not told her that he has been in more pain    Physical Examination:    NAD  right lower extremity:  Incision over the lateral  hip is healing well with staples in place  No drainage on dressing. No evidence of infection  No pain w log roll of hip    Knee: Non tender to palpation, stable in flexion and extension   ROM    No effusion   Pitting edema to leg   No change in NV status     Imagin views R hip: fracture of lateral cortex of femur at level of the lag screw.  Screw tip position unchanged but some backout of screw is seen laterally at the plate. Some reduction of fracture lost with these changes   2 views right knee: no change, long stem revision TKA In place     Assessment/Plan:  79 y.o. male  2 weeks s/p right SHS for intertrochanteric fracture (DOS-20)    Plan  1. Staples were removed today and steri strips were placed   2. Will revise WB restrictions to TDWB RLE. This was communicated in writing to [facility]   3. Discussed observation vs revision ORIF with daughter.  Given his low demand and multiple medical cormorbidities, will observe for now.  Will see him back in clinic 2 weeks with new XR. I encouraged his daughter to attend this visit and she was in agreement - she typically accompanies her father to visits.  If clinic is still restricted at that  time because of covid will d/w manager for an exception.  Mr Rome does have some memory issues and her presence at the visit will greatly benefit him.   4. Follow up in 2 weeks   with xrays  5. Will set up Vit D testing with [facility] [phone]  [facility] # [phone]    All questions were answered in detail. The patient is in full agreement with the treatment plan and will proceed accordingly.

## 2020-05-05 ENCOUNTER — TELEPHONE (OUTPATIENT)
Dept: ORTHOPEDICS | Facility: CLINIC | Age: 80
End: 2020-05-05

## 2020-05-05 NOTE — TELEPHONE ENCOUNTER
Spoke with patients daughter again today - she spoke with [facility] regarding lateral cortex fracture. They would like to investigate what happened and would like to discuss this with me which I will be happy to do.  She will give them the office number.

## 2020-05-05 NOTE — TELEPHONE ENCOUNTER
Returned call of Bianca Mac with [facility] ([phone]) to give additional information on the patient -- I clarified that he does have a new fracture adjacent to hardware seen on second set of xrays taken in clinic.  Weight bearing status is correctly documented on nursing home paperwork - he is to be TDWB to the RLE.     Needs vitamin D level drawn

## 2020-05-19 DIAGNOSIS — S72.141A CLOSED DISPLACED INTERTROCHANTERIC FRACTURE OF RIGHT FEMUR, INITIAL ENCOUNTER: Primary | ICD-10-CM

## 2020-06-03 ENCOUNTER — TELEPHONE (OUTPATIENT)
Dept: ORTHOPEDICS | Facility: CLINIC | Age: 80
End: 2020-06-03

## 2020-06-03 NOTE — TELEPHONE ENCOUNTER
----- Message from Janet Amaro MD sent at 6/3/2020  7:41 AM CDT -----  Can you get him rescheduled to next week?

## 2020-07-01 ENCOUNTER — TELEPHONE (OUTPATIENT)
Dept: ORTHOPEDICS | Facility: CLINIC | Age: 80
End: 2020-07-01

## 2020-07-01 NOTE — TELEPHONE ENCOUNTER
Spoken with Mr. Rome daughter( Odilia Bonilla)  about getting her father into to see Dr. Amaro as a follow of hip surgery( 05/04/20 ) after a fall he had at  [facility]. Have made several appointment for Mr. Rome and  have not show up for any  of them.  Mrs. Hartley said that her father is in hospice and having a lot more of his memory loss issues do his other health issues.

## 2021-12-27 NOTE — ASSESSMENT & PLAN NOTE
Trop 0.173 on admit. EKG no acute ST-T changes  TTE pending   He has a history of MI in the past and CAD with ischemic cardiomyopathy/CHF (per daughter)  Patient denies ACS symptoms prior to fall. Unclear if this is NSTEMI or demand ischemia  Cardiology consulted   Do not start hep gtt/asa/plavix given plans to go to OR for hip fracture today  Also, patient is on hospice care and not a good candidate for angiogram either way. Need to discuss goals of care more after surgery      show

## 2022-02-10 NOTE — NURSING
Patient arrived on unit in bed yelling in pain. Patient states pain is greater than 10 when he moves. Patient repositioned in bed.spoke with patient concerning his admit discussed pain of care with patient. pains now at 3 .  Patient requesting daughter is to get his wallet and phone.   Daughter already aware will call when she arrives.   Stable.

## 2023-01-26 NOTE — ASSESSMENT & PLAN NOTE
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

## 2024-09-24 NOTE — SUBJECTIVE & OBJECTIVE
Interval History:   Patient conversing pleasantly this am. Wants to get out of hospital. Would be open to rehab if needed. Agrees he will do his best to work with PT today. Eating today. May have had a bm yesterday.    Review of Systems   Respiratory: Negative for cough and shortness of breath.    Cardiovascular: Negative for chest pain.   Gastrointestinal: Negative for abdominal pain, constipation, diarrhea, nausea and vomiting.   Neurological: Negative for numbness.     Objective:     Vital Signs (Most Recent):  Temp: 98.1 °F (36.7 °C) (04/21/20 0734)  Pulse: 69 (04/21/20 0734)  Resp: 18 (04/21/20 0734)  BP: 114/65 (04/21/20 0734)  SpO2: 98 % (04/21/20 0758) Vital Signs (24h Range):  Temp:  [98.1 °F (36.7 °C)-98.9 °F (37.2 °C)] 98.1 °F (36.7 °C)  Pulse:  [52-72] 69  Resp:  [16-18] 18  SpO2:  [95 %-99 %] 98 %  BP: (110-128)/(60-66) 114/65     Weight: 72.1 kg (159 lb)  Body mass index is 21.56 kg/m².    Intake/Output Summary (Last 24 hours) at 4/21/2020 1050  Last data filed at 4/21/2020 0624  Gross per 24 hour   Intake --   Output 1000 ml   Net -1000 ml      Physical Exam   Constitutional: He is oriented to person, place, and time. He appears well-developed and well-nourished. No distress.   Pulmonary/Chest: Effort normal. No respiratory distress.   Abdominal: Soft. He exhibits no distension. There is no tenderness.   Neurological: He is alert and oriented to person, place, and time.   Distal RLE intact, good perfusion.   Psychiatric: He has a normal mood and affect. His behavior is normal.       Significant Labs:   Recent Results (from the past 24 hour(s))   CBC auto differential    Collection Time: 04/21/20  4:30 AM   Result Value Ref Range    WBC 7.06 3.90 - 12.70 K/uL    RBC 3.26 (L) 4.60 - 6.20 M/uL    Hemoglobin 8.4 (L) 14.0 - 18.0 g/dL    Hematocrit 26.9 (L) 40.0 - 54.0 %    Mean Corpuscular Volume 83 82 - 98 fL    Mean Corpuscular Hemoglobin 25.8 (L) 27.0 - 31.0 pg    Mean Corpuscular Hemoglobin Conc 31.2  (L) 32.0 - 36.0 g/dL    RDW 16.5 (H) 11.5 - 14.5 %    Platelets 261 150 - 350 K/uL    MPV 10.4 9.2 - 12.9 fL    Immature Granulocytes 0.3 0.0 - 0.5 %    Gran # (ANC) 4.2 1.8 - 7.7 K/uL    Immature Grans (Abs) 0.02 0.00 - 0.04 K/uL    Lymph # 2.3 1.0 - 4.8 K/uL    Mono # 0.5 0.3 - 1.0 K/uL    Eos # 0.0 0.0 - 0.5 K/uL    Baso # 0.01 0.00 - 0.20 K/uL    nRBC 0 0 /100 WBC    Gran% 59.0 38.0 - 73.0 %    Lymph% 33.0 18.0 - 48.0 %    Mono% 7.5 4.0 - 15.0 %    Eosinophil% 0.1 0.0 - 8.0 %    Basophil% 0.1 0.0 - 1.9 %    Differential Method Automated    Basic metabolic panel    Collection Time: 04/21/20  4:30 AM   Result Value Ref Range    Sodium 140 136 - 145 mmol/L    Potassium 3.9 3.5 - 5.1 mmol/L    Chloride 106 95 - 110 mmol/L    CO2 25 23 - 29 mmol/L    Glucose 93 70 - 110 mg/dL    BUN, Bld 56 (H) 8 - 23 mg/dL    Creatinine 1.3 0.5 - 1.4 mg/dL    Calcium 8.8 8.7 - 10.5 mg/dL    Anion Gap 9 8 - 16 mmol/L    eGFR if African American 60 >60 mL/min/1.73 m^2    eGFR if non African American 52 (A) >60 mL/min/1.73 m^2      Ipledge Number (Optional): [id number] Comments: Treatment goal is 100% clearance of inflammatory lesions Detail Level: Zone

## (undated) DEVICE — SEE MEDLINE ITEM 157117

## (undated) DEVICE — PAD TRACTION BOOT

## (undated) DEVICE — SEE L#120831

## (undated) DEVICE — COVER OVERHEAD SURG LT BLUE

## (undated) DEVICE — DRAPE C-ARM FOR MOBILE XRAY

## (undated) DEVICE — SUT VICRYL PLUS 0 CT1 36IN

## (undated) DEVICE — SUT VICRYL PLUS ANTIBACT

## (undated) DEVICE — CANISTER SUCTION 2 LTR

## (undated) DEVICE — POSITIONER IV ARMBOARD FOAM

## (undated) DEVICE — SEE MEDLINE ITEM 157166

## (undated) DEVICE — DRESSING AQUACEL AG ADV 3.5X12

## (undated) DEVICE — Device

## (undated) DEVICE — DRAPE C-ARMOR EQUIPMENT COVER

## (undated) DEVICE — KIT PT CARE HANA PROFX SSXT

## (undated) DEVICE — DRAPE STERI-DRAPE 83X125 IOBAN

## (undated) DEVICE — UNDERGLOVES BIOGEL PI SZ 7 LF

## (undated) DEVICE — STAPLER SKIN PROXIMATE WIDE

## (undated) DEVICE — GLOVE BIOGEL PI MICRO SZ 6.5

## (undated) DEVICE — GLOVE SURGICAL LATEX SZ 7

## (undated) DEVICE — UNDERGLOVE BIOGEL PI SZ 6.5 LF

## (undated) DEVICE — DRAPE STERI U-SHAPED 47X51IN

## (undated) DEVICE — BLANKET UPPER BODY 78.7X29.9IN

## (undated) DEVICE — SUT VICRYL 2-0 36 CT-1

## (undated) DEVICE — SEE MEDLINE ITEM 146417

## (undated) DEVICE — ELECTRODE REM PLYHSV RETURN 9

## (undated) DEVICE — BANDAGE ACE ELASTIC 6"

## (undated) DEVICE — APPLICATOR CHLORAPREP ORN 26ML

## (undated) DEVICE — DRAPE INCISE IOBAN 2 23X17IN

## (undated) DEVICE — SUPPORT ULNA NERVE PROTECTOR

## (undated) DEVICE — SEE MEDLINE ITEM 152622

## (undated) DEVICE — SEE MEDLINE ITEM 146292